# Patient Record
Sex: MALE | Race: WHITE | NOT HISPANIC OR LATINO | URBAN - METROPOLITAN AREA
[De-identification: names, ages, dates, MRNs, and addresses within clinical notes are randomized per-mention and may not be internally consistent; named-entity substitution may affect disease eponyms.]

---

## 2023-10-25 ENCOUNTER — APPOINTMENT (OUTPATIENT)
Dept: RADIOLOGY | Facility: HOSPITAL | Age: 83
End: 2023-10-25
Payer: MEDICARE

## 2023-10-25 ENCOUNTER — HOSPITAL ENCOUNTER (OUTPATIENT)
Facility: HOSPITAL | Age: 83
Setting detail: OBSERVATION
Discharge: LEFT BEFORE DISCHARGE | End: 2023-10-26
Attending: EMERGENCY MEDICINE | Admitting: FAMILY MEDICINE
Payer: MEDICARE

## 2023-10-25 VITALS
TEMPERATURE: 97.7 F | HEIGHT: 70 IN | SYSTOLIC BLOOD PRESSURE: 129 MMHG | DIASTOLIC BLOOD PRESSURE: 95 MMHG | BODY MASS INDEX: 22.9 KG/M2 | WEIGHT: 160 LBS | OXYGEN SATURATION: 91 % | RESPIRATION RATE: 26 BRPM | HEART RATE: 100 BPM

## 2023-10-25 DIAGNOSIS — R55 SYNCOPE, UNSPECIFIED SYNCOPE TYPE: Primary | ICD-10-CM

## 2023-10-25 PROBLEM — H25.11 AGE-RELATED NUCLEAR CATARACT OF RIGHT EYE: Status: ACTIVE | Noted: 2023-10-25

## 2023-10-25 PROBLEM — E55.9 VITAMIN D DEFICIENCY: Status: ACTIVE | Noted: 2023-10-25

## 2023-10-25 PROBLEM — E78.5 DYSLIPIDEMIA: Status: ACTIVE | Noted: 2023-10-25

## 2023-10-25 PROBLEM — G44.009 CLUSTER HEADACHE: Status: ACTIVE | Noted: 2023-10-25

## 2023-10-25 PROBLEM — S22.39XA RIB FRACTURE: Status: ACTIVE | Noted: 2023-10-25

## 2023-10-25 PROBLEM — R91.8 MASS OF RIGHT LUNG: Status: RESOLVED | Noted: 2023-10-25 | Resolved: 2023-10-25

## 2023-10-25 PROBLEM — F17.200 TOBACCO DEPENDENCE: Status: ACTIVE | Noted: 2018-03-07

## 2023-10-25 PROBLEM — H31.011 MACULAR SCAR OF RIGHT EYE: Status: ACTIVE | Noted: 2019-07-10

## 2023-10-25 PROBLEM — R63.4 ABNORMAL WEIGHT LOSS: Status: ACTIVE | Noted: 2023-10-25

## 2023-10-25 PROBLEM — M31.6 GIANT CELL ARTERITIS (MULTI): Status: ACTIVE | Noted: 2023-10-25

## 2023-10-25 PROBLEM — M31.6 GIANT CELL ARTERITIS (MULTI): Status: RESOLVED | Noted: 2023-10-25 | Resolved: 2023-10-25

## 2023-10-25 PROBLEM — M25.50 ACHE IN JOINT: Status: ACTIVE | Noted: 2023-10-25

## 2023-10-25 PROBLEM — S42.033A CLOSED FRACTURE OF DISTAL CLAVICLE: Status: RESOLVED | Noted: 2023-10-25 | Resolved: 2023-10-25

## 2023-10-25 PROBLEM — H43.812 VITREOUS DEGENERATION OF LEFT EYE: Status: ACTIVE | Noted: 2023-10-25

## 2023-10-25 PROBLEM — S42.033A CLOSED FRACTURE OF DISTAL CLAVICLE: Status: ACTIVE | Noted: 2023-10-25

## 2023-10-25 PROBLEM — F17.210 CIGARETTE SMOKER: Status: ACTIVE | Noted: 2023-10-25

## 2023-10-25 PROBLEM — H31.001 CHORIORETINAL SCAR OF RIGHT EYE: Status: ACTIVE | Noted: 2023-10-25

## 2023-10-25 PROBLEM — H25.12 AGE-RELATED NUCLEAR CATARACT OF LEFT EYE: Status: ACTIVE | Noted: 2023-10-25

## 2023-10-25 PROBLEM — R91.8 MASS OF RIGHT LUNG: Status: ACTIVE | Noted: 2023-10-25

## 2023-10-25 PROBLEM — S22.39XA RIB FRACTURE: Status: RESOLVED | Noted: 2023-10-25 | Resolved: 2023-10-25

## 2023-10-25 LAB
ABO GROUP (TYPE) IN BLOOD: NORMAL
ALBUMIN SERPL BCP-MCNC: 3.8 G/DL (ref 3.4–5)
ALP SERPL-CCNC: 63 U/L (ref 33–136)
ALT SERPL W P-5'-P-CCNC: 11 U/L (ref 10–52)
ANION GAP SERPL CALC-SCNC: 15 MMOL/L (ref 10–20)
ANTIBODY SCREEN: NORMAL
APPEARANCE UR: ABNORMAL
AST SERPL W P-5'-P-CCNC: 18 U/L (ref 9–39)
BASOPHILS # BLD AUTO: 0.07 X10*3/UL (ref 0–0.1)
BASOPHILS NFR BLD AUTO: 1.2 %
BILIRUB SERPL-MCNC: 0.6 MG/DL (ref 0–1.2)
BILIRUB UR STRIP.AUTO-MCNC: NEGATIVE MG/DL
BUN SERPL-MCNC: 23 MG/DL (ref 6–23)
CALCIUM SERPL-MCNC: 9.5 MG/DL (ref 8.6–10.6)
CARDIAC TROPONIN I PNL SERPL HS: 49 NG/L (ref 0–53)
CARDIAC TROPONIN I PNL SERPL HS: 51 NG/L (ref 0–53)
CHLORIDE SERPL-SCNC: 102 MMOL/L (ref 98–107)
CHLORIDE UR-SCNC: 105 MMOL/L
CHLORIDE/CREATININE (MMOL/G) IN URINE: 91 MMOL/G CREAT (ref 23–275)
CO2 SERPL-SCNC: 27 MMOL/L (ref 21–32)
COLOR UR: YELLOW
CREAT SERPL-MCNC: 1.53 MG/DL (ref 0.5–1.3)
CREAT UR-MCNC: 115.6 MG/DL (ref 20–370)
EOSINOPHIL # BLD AUTO: 0.17 X10*3/UL (ref 0–0.4)
EOSINOPHIL NFR BLD AUTO: 2.8 %
ERYTHROCYTE [DISTWIDTH] IN BLOOD BY AUTOMATED COUNT: 15.1 % (ref 11.5–14.5)
GFR SERPL CREATININE-BSD FRML MDRD: 45 ML/MIN/1.73M*2
GLUCOSE SERPL-MCNC: 104 MG/DL (ref 74–99)
GLUCOSE UR STRIP.AUTO-MCNC: NEGATIVE MG/DL
HCT VFR BLD AUTO: 33.7 % (ref 41–52)
HGB BLD-MCNC: 11.4 G/DL (ref 13.5–17.5)
HOLD SPECIMEN: NORMAL
IMM GRANULOCYTES # BLD AUTO: 0.02 X10*3/UL (ref 0–0.5)
IMM GRANULOCYTES NFR BLD AUTO: 0.3 % (ref 0–0.9)
INR PPP: 1.2 (ref 0.9–1.1)
IRON SATN MFR SERPL: 8 % (ref 25–45)
IRON SERPL-MCNC: 39 UG/DL (ref 35–150)
KETONES UR STRIP.AUTO-MCNC: NEGATIVE MG/DL
LEUKOCYTE ESTERASE UR QL STRIP.AUTO: ABNORMAL
LYMPHOCYTES # BLD AUTO: 1.37 X10*3/UL (ref 0.8–3)
LYMPHOCYTES NFR BLD AUTO: 22.8 %
MAGNESIUM SERPL-MCNC: 1.96 MG/DL (ref 1.6–2.4)
MCH RBC QN AUTO: 28.9 PG (ref 26–34)
MCHC RBC AUTO-ENTMCNC: 33.8 G/DL (ref 32–36)
MCV RBC AUTO: 86 FL (ref 80–100)
MONOCYTES # BLD AUTO: 0.74 X10*3/UL (ref 0.05–0.8)
MONOCYTES NFR BLD AUTO: 12.3 %
MUCOUS THREADS #/AREA URNS AUTO: ABNORMAL /LPF
NEUTROPHILS # BLD AUTO: 3.64 X10*3/UL (ref 1.6–5.5)
NEUTROPHILS NFR BLD AUTO: 60.6 %
NITRITE UR QL STRIP.AUTO: NEGATIVE
NRBC BLD-RTO: 0 /100 WBCS (ref 0–0)
PH UR STRIP.AUTO: 5 [PH]
PLATELET # BLD AUTO: 228 X10*3/UL (ref 150–450)
PMV BLD AUTO: 11 FL (ref 7.5–11.5)
POTASSIUM SERPL-SCNC: 4 MMOL/L (ref 3.5–5.3)
POTASSIUM UR-SCNC: 28 MMOL/L
POTASSIUM/CREAT UR-RTO: 24 MMOL/G CREAT
PROT SERPL-MCNC: 7.4 G/DL (ref 6.4–8.2)
PROT UR STRIP.AUTO-MCNC: NEGATIVE MG/DL
PROTHROMBIN TIME: 13.2 SECONDS (ref 9.8–12.8)
RBC # BLD AUTO: 3.94 X10*6/UL (ref 4.5–5.9)
RBC # UR STRIP.AUTO: ABNORMAL /UL
RBC #/AREA URNS AUTO: ABNORMAL /HPF
RH FACTOR (ANTIGEN D): NORMAL
SARS-COV-2 RNA RESP QL NAA+PROBE: NOT DETECTED
SODIUM SERPL-SCNC: 140 MMOL/L (ref 136–145)
SODIUM UR-SCNC: 121 MMOL/L
SODIUM/CREAT UR-RTO: 105 MMOL/G CREAT
SP GR UR STRIP.AUTO: 1.01
TIBC SERPL-MCNC: 482 UG/DL (ref 240–445)
TSH SERPL-ACNC: 1.88 MIU/L (ref 0.44–3.98)
UIBC SERPL-MCNC: 443 UG/DL (ref 110–370)
UROBILINOGEN UR STRIP.AUTO-MCNC: 2 MG/DL
WBC # BLD AUTO: 6 X10*3/UL (ref 4.4–11.3)
WBC #/AREA URNS AUTO: ABNORMAL /HPF
WBC CLUMPS #/AREA URNS AUTO: ABNORMAL /HPF

## 2023-10-25 PROCEDURE — 80053 COMPREHEN METABOLIC PANEL: CPT | Performed by: EMERGENCY MEDICINE

## 2023-10-25 PROCEDURE — 86850 RBC ANTIBODY SCREEN: CPT | Performed by: EMERGENCY MEDICINE

## 2023-10-25 PROCEDURE — 83550 IRON BINDING TEST: CPT | Performed by: STUDENT IN AN ORGANIZED HEALTH CARE EDUCATION/TRAINING PROGRAM

## 2023-10-25 PROCEDURE — G0378 HOSPITAL OBSERVATION PER HR: HCPCS

## 2023-10-25 PROCEDURE — 84133 ASSAY OF URINE POTASSIUM: CPT | Performed by: STUDENT IN AN ORGANIZED HEALTH CARE EDUCATION/TRAINING PROGRAM

## 2023-10-25 PROCEDURE — 86901 BLOOD TYPING SEROLOGIC RH(D): CPT | Performed by: EMERGENCY MEDICINE

## 2023-10-25 PROCEDURE — 2500000001 HC RX 250 WO HCPCS SELF ADMINISTERED DRUGS (ALT 637 FOR MEDICARE OP): Performed by: STUDENT IN AN ORGANIZED HEALTH CARE EDUCATION/TRAINING PROGRAM

## 2023-10-25 PROCEDURE — 36415 COLL VENOUS BLD VENIPUNCTURE: CPT | Performed by: EMERGENCY MEDICINE

## 2023-10-25 PROCEDURE — 71045 X-RAY EXAM CHEST 1 VIEW: CPT | Performed by: RADIOLOGY

## 2023-10-25 PROCEDURE — 99291 CRITICAL CARE FIRST HOUR: CPT | Mod: 25 | Performed by: EMERGENCY MEDICINE

## 2023-10-25 PROCEDURE — 36415 COLL VENOUS BLD VENIPUNCTURE: CPT | Mod: 59

## 2023-10-25 PROCEDURE — 83735 ASSAY OF MAGNESIUM: CPT | Performed by: EMERGENCY MEDICINE

## 2023-10-25 PROCEDURE — 87635 SARS-COV-2 COVID-19 AMP PRB: CPT | Performed by: EMERGENCY MEDICINE

## 2023-10-25 PROCEDURE — 84484 ASSAY OF TROPONIN QUANT: CPT | Performed by: EMERGENCY MEDICINE

## 2023-10-25 PROCEDURE — 96360 HYDRATION IV INFUSION INIT: CPT

## 2023-10-25 PROCEDURE — 84443 ASSAY THYROID STIM HORMONE: CPT | Performed by: STUDENT IN AN ORGANIZED HEALTH CARE EDUCATION/TRAINING PROGRAM

## 2023-10-25 PROCEDURE — 81001 URINALYSIS AUTO W/SCOPE: CPT | Performed by: EMERGENCY MEDICINE

## 2023-10-25 PROCEDURE — 71250 CT THORAX DX C-: CPT | Mod: MG

## 2023-10-25 PROCEDURE — 71250 CT THORAX DX C-: CPT | Mod: FOREIGN READ | Performed by: RADIOLOGY

## 2023-10-25 PROCEDURE — 99291 CRITICAL CARE FIRST HOUR: CPT | Performed by: EMERGENCY MEDICINE

## 2023-10-25 PROCEDURE — 71045 X-RAY EXAM CHEST 1 VIEW: CPT | Mod: FY

## 2023-10-25 PROCEDURE — 86900 BLOOD TYPING SEROLOGIC ABO: CPT | Performed by: EMERGENCY MEDICINE

## 2023-10-25 PROCEDURE — 82728 ASSAY OF FERRITIN: CPT | Performed by: STUDENT IN AN ORGANIZED HEALTH CARE EDUCATION/TRAINING PROGRAM

## 2023-10-25 PROCEDURE — 85610 PROTHROMBIN TIME: CPT | Performed by: EMERGENCY MEDICINE

## 2023-10-25 PROCEDURE — 83540 ASSAY OF IRON: CPT | Performed by: STUDENT IN AN ORGANIZED HEALTH CARE EDUCATION/TRAINING PROGRAM

## 2023-10-25 PROCEDURE — 87086 URINE CULTURE/COLONY COUNT: CPT | Performed by: EMERGENCY MEDICINE

## 2023-10-25 PROCEDURE — 85025 COMPLETE CBC W/AUTO DIFF WBC: CPT | Performed by: EMERGENCY MEDICINE

## 2023-10-25 PROCEDURE — 84484 ASSAY OF TROPONIN QUANT: CPT

## 2023-10-25 PROCEDURE — 2500000004 HC RX 250 GENERAL PHARMACY W/ HCPCS (ALT 636 FOR OP/ED): Performed by: EMERGENCY MEDICINE

## 2023-10-25 RX ORDER — TALC
3 POWDER (GRAM) TOPICAL DAILY
Status: DISCONTINUED | OUTPATIENT
Start: 2023-10-25 | End: 2023-10-26 | Stop reason: HOSPADM

## 2023-10-25 RX ORDER — ACETAMINOPHEN 650 MG/1
650 SUPPOSITORY RECTAL EVERY 4 HOURS PRN
Status: DISCONTINUED | OUTPATIENT
Start: 2023-10-25 | End: 2023-10-26 | Stop reason: HOSPADM

## 2023-10-25 RX ORDER — ACETAMINOPHEN 160 MG/5ML
650 SOLUTION ORAL EVERY 4 HOURS PRN
Status: DISCONTINUED | OUTPATIENT
Start: 2023-10-25 | End: 2023-10-26 | Stop reason: HOSPADM

## 2023-10-25 RX ORDER — IBUPROFEN 200 MG
200 TABLET ORAL EVERY 6 HOURS PRN
COMMUNITY

## 2023-10-25 RX ORDER — ENOXAPARIN SODIUM 100 MG/ML
40 INJECTION SUBCUTANEOUS EVERY 24 HOURS
Status: DISCONTINUED | OUTPATIENT
Start: 2023-10-25 | End: 2023-10-26 | Stop reason: HOSPADM

## 2023-10-25 RX ORDER — POLYETHYLENE GLYCOL 3350 17 G/17G
17 POWDER, FOR SOLUTION ORAL DAILY
Status: DISCONTINUED | OUTPATIENT
Start: 2023-10-25 | End: 2023-10-26 | Stop reason: HOSPADM

## 2023-10-25 RX ORDER — ACETAMINOPHEN 325 MG/1
650 TABLET ORAL EVERY 4 HOURS PRN
Status: DISCONTINUED | OUTPATIENT
Start: 2023-10-25 | End: 2023-10-26 | Stop reason: HOSPADM

## 2023-10-25 RX ORDER — ASPIRIN 81 MG/1
81 TABLET ORAL DAILY
COMMUNITY

## 2023-10-25 RX ORDER — ASPIRIN 81 MG/1
81 TABLET ORAL DAILY
Status: DISCONTINUED | OUTPATIENT
Start: 2023-10-25 | End: 2023-10-26 | Stop reason: HOSPADM

## 2023-10-25 RX ADMIN — SODIUM CHLORIDE, SODIUM LACTATE, POTASSIUM CHLORIDE, AND CALCIUM CHLORIDE 1000 ML: 600; 310; 30; 20 INJECTION, SOLUTION INTRAVENOUS at 10:20

## 2023-10-25 RX ADMIN — ASPIRIN 81 MG: 81 TABLET, COATED ORAL at 17:10

## 2023-10-25 ASSESSMENT — COLUMBIA-SUICIDE SEVERITY RATING SCALE - C-SSRS
2. HAVE YOU ACTUALLY HAD ANY THOUGHTS OF KILLING YOURSELF?: NO
1. IN THE PAST MONTH, HAVE YOU WISHED YOU WERE DEAD OR WISHED YOU COULD GO TO SLEEP AND NOT WAKE UP?: NO
6. HAVE YOU EVER DONE ANYTHING, STARTED TO DO ANYTHING, OR PREPARED TO DO ANYTHING TO END YOUR LIFE?: NO

## 2023-10-25 ASSESSMENT — ENCOUNTER SYMPTOMS
PALPITATIONS: 0
VOMITING: 0
LIGHT-HEADEDNESS: 1
DIARRHEA: 0
CONFUSION: 0
UNEXPECTED WEIGHT CHANGE: 0
WEAKNESS: 0
CONSTIPATION: 0
SEIZURES: 0
ANAL BLEEDING: 0
ABDOMINAL PAIN: 0
CHILLS: 0
BLOOD IN STOOL: 0
HEADACHES: 1
NAUSEA: 0
FEVER: 0
ARTHRALGIAS: 0
HEMATURIA: 0
DIZZINESS: 0
SHORTNESS OF BREATH: 0
WOUND: 1

## 2023-10-25 ASSESSMENT — PAIN SCALES - GENERAL
PAINLEVEL_OUTOF10: 0 - NO PAIN
PAINLEVEL_OUTOF10: 0 - NO PAIN

## 2023-10-25 ASSESSMENT — PAIN - FUNCTIONAL ASSESSMENT: PAIN_FUNCTIONAL_ASSESSMENT: 0-10

## 2023-10-25 NOTE — ED PROVIDER NOTES
HPI   Chief Complaint   Patient presents with    Syncope       Limitations to History: None    HPI: This is an 83-year-old male with no reported medical history who presents to the emergency room with concerns for syncopal episodes.  Patient was brought in by his roommate for multiple syncopal episodes that occurred yesterday.  While in triage, patient did have another syncopal episode.  Patient did become hypotensive so patient became a critical patient.  Patient was alert and oriented and was able to answer and respond to questions.  Patient's blood pressure immediately normalized when getting into a room.  Patient is currently asymptomatic and denies any chest pain, shortness of breath, lightheadedness, or dizziness.  Patient denies any headaches.  Patient denies any hemoptysis, recent travel, history of DVT/PE, or swelling of the legs.  Patient does smoke tobacco and has an unknown family history, stating that he is adopted.  Patient denies any drug or alcohol use.  Overall, patient does appear disheveled and does not look like he is taking care of himself well.  Patient states that he lives in a home that does not have running water.    Additional History Obtained from: None    12 point review of systems was performed and is negative unless otherwise specified    ------------------------------------------------------------------------------------------------------------------------------------------  Physical Exam:    VS: As documented in the triage note and EMR flowsheet from this visit were reviewed.    CONSTITUTIONAL: Well appearing, well nourished, awake, alert, oriented to person, place, time/situation and in no apparent distress.   EYES: Clear bilaterally, pupils equal, round and reactive to light.   CARDIOVASCULAR: Normal rate, regular rhythm.  Heart sounds S1, S2.  No murmurs, rubs or gallops.  RESPIRATORY: Breath sounds clear and equal bilaterally. No wheezes or rhonchi.   GASTROINTESTINAL: Abdomen  soft, non-distended, no rebound, no guarding.   MUSCULOSKELETAL: Spine appears normal, range of motion is not limited, no muscle or joint tenderness.   NEUROLOGICAL: Alert and oriented, no focal deficits, no motor or sensory deficits.  Cranial nerves II through XII intact bilaterally.  No cerebellar ataxia.  Patient is able to ambulate effectively.  SKIN: Skin normal color for race, warm, dry and intact. No evidence of trauma.   PSYCHIATRIC: Alert and oriented to person, place, time/situation. normal mood and affect. No apparent risk to self or others.     ------------------------------------------------------------------------------------------------------------------------------------------    Medical Decision Making:    This is an 83-year-old male with no pertinent medical history who presents to the emergency room with multiple syncopal episodes.  Patient remained stable throughout course of care.  Patient's initial blood pressure was concerning for hypotension.  When patient went into a room, his blood pressure normalized back to 126/81 with a normal heart rate.  Neurological exam is completely unremarkable and lowers my concern for stroke.  I have low concern for PE or ACS.  Based on clinical presentation, patient is most likely dehydrated which is causing hypovolemic syncopal episodes.  Given patient's age, he is at risk for cardiogenic syncope.  This patient was staffed my supervising attending.  Patient was started on 1 L LR.  Chest x-ray and EKG were ordered.  Venous full panel, CBC with differential, CMP, magnesium, coagulation screen, COVID screen, troponin, urinalysis, and type and screen ordered.  CBC significant for hemoglobin of 11.4.  Chest x-ray was significant for emphysema but was unremarkable for any acute processes.  CMP was significant for a creatinine of 1.53 which is elevated from baseline, consistent with an acute kidney injury.  Troponin was 51.  Will trend.  I discussed this case with the   and he will be following this case and discussing with Adult Protective Services.  I did discuss this case with my supervising attending and family medicine.  The decision was made to make patient to their service for a syncope work-up which will include a carotid ultrasound and echocardiogram.  Patient was agreeable to spine.  Patient remain stable throughout the remainder course of this provider's care.      External Records Reviewed: I reviewed recent and relevant outside records including: Previous provider notes    Independent Interpretation of Studies:  I independently interpreted: Chest x-ray    Escalation of Care:  Appropriate for admission to family medicine    Social Determinants Affecting Care:  There is some concern that patient is not able to care for himself      Discussion of Management with Other Providers:   I discussed the patient/results with: Family medicine, supervising attending.      SHARONDA Yip, PA-C  [facility]                            Niall Coma Scale Score: 15                  Patient History   Past Medical History:   Diagnosis Date    Macula scars of posterior pole (postinflammatory) (post-traumatic), right eye 07/10/2019    Macular scar of right eye     No past surgical history on file.  No family history on file.  Social History     Tobacco Use    Smoking status: Never    Smokeless tobacco: Never   Substance Use Topics    Alcohol use: Not on file    Drug use: Not on file       Physical Exam   ED Triage Vitals [10/25/23 1001]   Temp Heart Rate Resp BP   36.5 °C (97.7 °F) 69 18 81/57      SpO2 Temp src Heart Rate Source Patient Position   99 % -- -- --      BP Location FiO2 (%)     -- --       Physical Exam    ED Course & MDM   Diagnoses as of 10/27/23 1419   Syncope, unspecified syncope type       Medical Decision Making      Procedure  Critical Care    Performed by: Akash Collazo  MD  Authorized by: Akash Collazo MD    Critical care provider statement:     Critical care time (minutes):  30    Critical care time was exclusive of:  Separately billable procedures and treating other patients and teaching time    Critical care was necessary to treat or prevent imminent or life-threatening deterioration of the following conditions: hemodynamic instability with hypotension and tachycardia requiring aggressive intravenous fluid resuscitation and continuous cardiac monitoring.    Critical care was time spent personally by me on the following activities:  Development of treatment plan with patient or surrogate, examination of patient, evaluation of patient's response to treatment, review of old charts, re-evaluation of patient's condition, ordering and review of radiographic studies, ordering and review of laboratory studies and ordering and performing treatments and interventions    I assumed direction of critical care for this patient from another provider in my specialty: no      Care discussed with: admitting provider         Pepe Khoury PA-C  10/25/23 3766       This patient was seen by the advanced practice provider.  I have personally performed a substantive portion of the encounter.  I have seen and examined the patient; agree with the workup, evaluation, MDM, management and diagnosis.  The care plan has been discussed.         Akash Collazo MD  10/27/23 9339

## 2023-10-25 NOTE — PROGRESS NOTES
"Florentin Anderson is a 83 y.o. male on day 1 of admission presenting with Syncope, unspecified syncope type.    Intervention:   Collaboration with [facility] due to reported bed bugs on Pt at admission. TC said he met with visiting \"roommate\" and \"roommate's brother.  Roommate brother is Vivi Gama [phone].  He allegedly takes Pt and his brother to get food and water. Allegedly Pt's water is off but electricity and gas are on. Vivi reportedly described Pt's residence to [facility] as a \"intermediate house\" that is converted to apartments. Both parties pay rent to a landlord Vivi described as a \" retired pimp\" according to [facility]. Vivi stated his brother is an unreliable historian/coping with dementia. He would like to be a contact if Pt agrees.   Vivi reportedly to [facility] allegedly that his brother is a sex offender. He allegedly met Pt while they were incarcerated 15 years ago. Both are \"on parole for life\" according to [facility] report to this SW. Vivi told [facility] Pt may have poor hygiene due to no water.    Assessment:  Principal Problem:    Syncope, unspecified syncope type  Active Problems:    Syncope and collapse    Plan: To call APS while business hours are open 8-5pm to make report. SW to determine dispo with provider on 10/26 due to end of shift for this SW today.    MONICO Gudino      "

## 2023-10-25 NOTE — PROGRESS NOTES
"I spoke to Vivi Gama ([phone]). Mr. Gama is the patient's roommate's brother. Mr. Gama gave the following information regarding the patient's living situation. The patient lives with his brother in an apartment complex. This complex might have been a half way house in the past. The complex is in poor condition. There is not running water in the complex. The other utilities are on as far as Mr. Gama knows. There is food in their apartment. Mr. Gama takes the patient and his brother to get food and bottle water. Mr. Gama says the eva young is aware of how bad the conditions are in the complex, but he does not care. Mr. Gama thinks the eva young is a \"retired pimp.\" There is an odor that comes from the apartment. Mr. Gama believes they are not taking care of themselves in the apartment. Department to follow.     Con Lindsey RN      "

## 2023-10-25 NOTE — ED TRIAGE NOTES
"Patient came to ED with c/o sycope. States he was walking down the street when he passed out. Friend witnessed episode and states he was \"out\" for 5 to 10 minutes. Patient has had episode like this a couple days ago.   "

## 2023-10-25 NOTE — H&P
History Of Present Illness  Florentin Anderson is a 83 y.o. male who denies any past medical history although who has not seen a physician in >1 year presenting with syncope. He states that he was walking to the bus stop with his friend when he started feeling unsteady. They turned around to walk home, since he didn't think he would make it to the bus stop, but instead he passed out and fell. He thinks he bruised his arm in the fall but denies head injury. He was unconscious for <1 minute. Prior to passing out he reports feeling lightheaded. He denies vertigo, no vision changes, no chest pain, no palpitations, no shortness of breath. He does endorse a mild headache but says that started after the syncopal episode. He denies history of similar episodes. He subsequently came to the ER for evaluation.    While in the waiting room he reported had another syncopal episode, this time while sitting in the ER. He again reports feeling lightheaded prior to the episode and was reportedly unconscious for <1 minute. He was immediately evaluated by ER staff who found him to be markedly hypotensive to 81/57 with a heart rate of 69. Vitals improved spontaneously to 110s/60-70s with HR initially increasing to the 100s. He was treated with 1 L LR for presumed dehydration with improvement in tachycardia.    At time of my evaluation patient denied any symptoms, including dizziness/lightheadedness.    Of note, patient and roommate's brother reported to ER staff that his apartment currently does not have water. Roommates brother reported that they have very poor living conditions and that he is concerned the patient is not taking care of himself. ER SW was consulted. ER staff also reportedly found a bed bug during evaluation and patient reported that he has bed bug bites on his legs.     Past Medical History  Past Medical History:   Diagnosis Date    Closed fracture of distal clavicle 10/25/2023    Giant cell arteritis (CMS/HCC) 10/25/2023     Macula scars of posterior pole (postinflammatory) (post-traumatic), right eye 07/10/2019    Macular scar of right eye    Mass of right lung 10/25/2023    Rib fracture 10/25/2023     Surgical History  Past Surgical History:   Procedure Laterality Date    CHEST SURGERY      patient does not recall the details     Social History  He reports that he has been smoking cigarettes. He has a 30.00 pack-year smoking history. He has never used smokeless tobacco. He reports that he does not drink alcohol and does not use drugs.    Family History  Family History   Adopted: Yes     Allergies  Patient has no known allergies.    Medications:  Medication Documentation Review Audit       Reviewed by Grover Ponce MD (Resident) on 10/25/23 at 1651      Medication Order Taking? Sig Documenting Provider Last Dose Status   aspirin 81 mg EC tablet [id number] No Take 1 tablet (81 mg) by mouth once daily. Historical Provider, MD 10/24/2023 Active   ibuprofen 200 mg tablet [id number]  Take 1 tablet (200 mg) by mouth every 6 hours if needed for mild pain (1 - 3). Historical Provider, MD  Active                   Review of Systems   Constitutional:  Negative for chills, fever and unexpected weight change.   Eyes:  Negative for visual disturbance (although patient does report gradual worsening of vision he denies any changes over the last 24 hours).   Respiratory:  Negative for shortness of breath.    Cardiovascular:  Negative for chest pain and palpitations.   Gastrointestinal:  Negative for abdominal pain, anal bleeding, blood in stool, constipation, diarrhea, nausea and vomiting.   Genitourinary:  Negative for hematuria.   Musculoskeletal:  Negative for arthralgias.   Skin:  Positive for wound (bed bug bites on legs).   Neurological:  Positive for syncope, light-headedness and headaches (mild headache). Negative for dizziness (no vertigo), seizures and weakness.   Psychiatric/Behavioral:  Negative for confusion.      Physical  Exam  Vitals reviewed.   Constitutional:       General: He is not in acute distress.     Appearance: Normal appearance. He is cachectic.      Comments: Appears somewhat disheveled   HENT:      Head: Normocephalic and atraumatic.   Eyes:      Conjunctiva/sclera: Conjunctivae normal.   Neck:      Vascular: No carotid bruit.   Cardiovascular:      Rate and Rhythm: Normal rate. Rhythm irregular.      Pulses: Normal pulses.           Dorsalis pedis pulses are 2+ on the right side and 2+ on the left side.      Heart sounds: No murmur heard.     No friction rub. No gallop.   Pulmonary:      Effort: Pulmonary effort is normal. No respiratory distress.      Breath sounds: Wheezing (end expiratory wheezing with prolonged expiratory phase) present. No rhonchi or rales.   Abdominal:      General: Abdomen is flat.      Palpations: Abdomen is soft.      Tenderness: There is no abdominal tenderness.   Musculoskeletal:         General: Normal range of motion.      Cervical back: Normal range of motion.   Feet:      Right foot:      Skin integrity: Skin integrity normal.      Toenail Condition: Right toenails are abnormally thick. Fungal disease present.     Left foot:      Skin integrity: Skin integrity normal.      Toenail Condition: Left toenails are abnormally thick. Fungal disease present.  Skin:     General: Skin is warm and dry.      Findings: Rash and wound (numerous open punctate lesions on the bilateral lower extremities and back which patient attributes to bed bugs) present. Rash is macular (hypopigmented macules across the upper back).   Neurological:      General: No focal deficit present.      Mental Status: He is alert and oriented to person, place, and time.      Comments: CN 2-12 grossly intact   Psychiatric:         Mood and Affect: Affect normal.         Speech: Speech normal.         Behavior: Behavior normal.       Last Recorded Vitals  Blood pressure 149/78, pulse 104, temperature 36.5 °C (97.7 °F), resp. rate  "19, height 1.778 m (5' 10\"), weight 72.6 kg (160 lb), SpO2 91 %.    Relevant Results    Results for orders placed or performed during the hospital encounter of 10/25/23 (from the past 24 hour(s))   CBC and Auto Differential   Result Value Ref Range    WBC 6.0 4.4 - 11.3 x10*3/uL    nRBC 0.0 0.0 - 0.0 /100 WBCs    RBC 3.94 (L) 4.50 - 5.90 x10*6/uL    Hemoglobin 11.4 (L) 13.5 - 17.5 g/dL    Hematocrit 33.7 (L) 41.0 - 52.0 %    MCV 86 80 - 100 fL    MCH 28.9 26.0 - 34.0 pg    MCHC 33.8 32.0 - 36.0 g/dL    RDW 15.1 (H) 11.5 - 14.5 %    Platelets 228 150 - 450 x10*3/uL    MPV 11.0 7.5 - 11.5 fL    Neutrophils % 60.6 40.0 - 80.0 %    Immature Granulocytes %, Automated 0.3 0.0 - 0.9 %    Lymphocytes % 22.8 13.0 - 44.0 %    Monocytes % 12.3 2.0 - 10.0 %    Eosinophils % 2.8 0.0 - 6.0 %    Basophils % 1.2 0.0 - 2.0 %    Neutrophils Absolute 3.64 1.60 - 5.50 x10*3/uL    Immature Granulocytes Absolute, Automated 0.02 0.00 - 0.50 x10*3/uL    Lymphocytes Absolute 1.37 0.80 - 3.00 x10*3/uL    Monocytes Absolute 0.74 0.05 - 0.80 x10*3/uL    Eosinophils Absolute 0.17 0.00 - 0.40 x10*3/uL    Basophils Absolute 0.07 0.00 - 0.10 x10*3/uL   Comprehensive metabolic panel   Result Value Ref Range    Glucose 104 (H) 74 - 99 mg/dL    Sodium 140 136 - 145 mmol/L    Potassium 4.0 3.5 - 5.3 mmol/L    Chloride 102 98 - 107 mmol/L    Bicarbonate 27 21 - 32 mmol/L    Anion Gap 15 10 - 20 mmol/L    Urea Nitrogen 23 6 - 23 mg/dL    Creatinine 1.53 (H) 0.50 - 1.30 mg/dL    eGFR 45 (L) >60 mL/min/1.73m*2    Calcium 9.5 8.6 - 10.6 mg/dL    Albumin 3.8 3.4 - 5.0 g/dL    Alkaline Phosphatase 63 33 - 136 U/L    Total Protein 7.4 6.4 - 8.2 g/dL    AST 18 9 - 39 U/L    Bilirubin, Total 0.6 0.0 - 1.2 mg/dL    ALT 11 10 - 52 U/L   Magnesium   Result Value Ref Range    Magnesium 1.96 1.60 - 2.40 mg/dL   Troponin I, High Sensitivity   Result Value Ref Range    Troponin I, High Sensitivity 51 0 - 53 ng/L   Type And Screen   Result Value Ref Range    ABO TYPE A "     Rh TYPE POS     ANTIBODY SCREEN NEG    Protime-INR   Result Value Ref Range    Protime 13.2 (H) 9.8 - 12.8 seconds    INR 1.2 (H) 0.9 - 1.1   Iron and TIBC   Result Value Ref Range    Iron 39 35 - 150 ug/dL    UIBC 443 (H) 110 - 370 ug/dL    TIBC 482 (H) 240 - 445 ug/dL    % Saturation 8 (L) 25 - 45 %   SARS-CoV-2 RT PCR   Result Value Ref Range    Coronavirus 2019, PCR Not Detected Not Detected   Urinalysis with Reflex Microscopic and Culture   Result Value Ref Range    Color, Urine Yellow Straw, Yellow    Appearance, Urine Hazy (N) Clear    Specific Gravity, Urine 1.015 1.005 - 1.035    pH, Urine 5.0 5.0, 5.5, 6.0, 6.5, 7.0, 7.5, 8.0    Protein, Urine NEGATIVE NEGATIVE mg/dL    Glucose, Urine NEGATIVE NEGATIVE mg/dL    Blood, Urine MODERATE (2+) (A) NEGATIVE    Ketones, Urine NEGATIVE NEGATIVE mg/dL    Bilirubin, Urine NEGATIVE NEGATIVE    Urobilinogen, Urine 2.0 (N) <2.0 mg/dL    Nitrite, Urine NEGATIVE NEGATIVE    Leukocyte Esterase, Urine MODERATE (2+) (A) NEGATIVE   Extra Urine Gray Tube   Result Value Ref Range    Extra Tube Hold for add-ons.    Troponin I, High Sensitivity   Result Value Ref Range    Troponin I, High Sensitivity 49 0 - 53 ng/L   Microscopic Only, Urine   Result Value Ref Range    WBC, Urine 21-50 (A) 1-5, NONE /HPF    WBC Clumps, Urine RARE Reference range not established. /HPF    RBC, Urine 11-20 (A) NONE, 1-2, 3-5 /HPF    Mucus, Urine 1+ Reference range not established. /LPF   Urine electrolytes   Result Value Ref Range    Sodium, Urine Random 121 mmol/L    Sodium/Creatinine Ratio 105 Not established. mmol/g Creat    Potassium, Urine Random 28 mmol/L    Potassium/Creatinine Ratio 24 Not established mmol/g Creat    Chloride, Urine Random 105 mmol/L    Chloride/Creatinine Ratio 91 23 - 275 mmol/g creat    Creatinine, Urine Random 115.6 20.0 - 370.0 mg/dL      XR chest 1 view  FINDINGS: The cardiac silhouette size is within normal limits. Hyperinflated lungs with flattened hemidiaphragms  compatible with emphysema. Calcified granulomas noted in the right lower lung. Large hiatal hernia noted. There is no focal consolidation, edema or pneumothorax. No sizeable pleural effusion. No acute osseous abnormality. Chronic posttraumatic sequela of the right lateral clavicle noted.   1. No acute cardiopulmonary process. 2. Findings of emphysema  Signed by: Viri Sullivan 10/25/2023 11:54 AM    EKG: NSR at 87 bpm with frequent PACs, left axis, normal intervals, no acute ST-T changes    Assessment/Plan   Principal Problem:    Syncope, unspecified syncope type    #Syncope  -patient presents w/ multiple syncope episodes preceded by lightheadedness  -differential includes orthostatic syncope, cardiogenic syncope, vasovagal syncope, and neurologic syncope  -lightheadedness is c/w orthostatic or vasovagal syncope, although syncope did not occur immediately after standing and was not associated with vagal maneuver  -hypotension immediately post syncope w/ HR in the 60s may be c/w vasovagal syncope  -improvement in symptoms after IVF and the fact that patient does not have water at home suggests possible dehydration which may contribute to orthostatic syncope  -cardiac workup in the ER negative with the exception of frequent PACs on ECG  -no known cardiac history, although prior imaging has shown significant coronary artery calcifications  -no other neuro symptoms, unremarkable neuro exam, no seizure activity, so no indication for carotid artery assessment or brain imaging  -hypotension with BP of <90 after syncopal episode does place patient at high risk of cardiogenic syncope, especially given CAD and age  [ ] maintain on telemetry for further evaluation  [ ]echocardiogram for further assessment of cardiac function  [ ]orthostatic vital signs  [ ]continue to monitor for symptoms and consider possible need for outpatient autonomic testing    #Lung mass  #Emphysema  #Nicotine dependence  -hx of previously diagnosed  RUL mass w/o PET uptake; recommended for 6 month f/up CT but has not had follow up  [ ] repeat chest CT while admitted for further assessment given noted cachexia and long smoking history  -chest imaging c/w emphysema although w/o formal diagnosis of COPD; currently w/o respiratory distress, continue to monitor  -offer nicotine patch, smoking cessation counseling PRN    #JULIETA vs CKD  -creatinine on admission is 1.53, up from 1.23 on last labs from April 2022  -possible pre-renal d/t dehydration given hypotension, syncope, lack of running water  -s/p 1 L IVF in the ER  [ ] urine electrolytes for further evaluation    #Cachexia/dishevelment  -patient appears cachectic on exam w/ concerns that he is not adequately caring for himself and that his apartment is unsafe (lack of running water, presence of bed bugs)  -A&O x3 on exam, no apparent cognitive deficits; reports he is independent in ADLs, receives assistance from roommate with IADLs  [ ] referral to  for additional evaluation of living environment  [ ] folate, B12, TSH, iron studies, prealbumin, vitamin D ordered for further evaluation  -HCV and syphilis negative April 2022, HIV negative April 2014; not re-ordered  -bed bug precautions    #Abnormal urinalysis  -UA obtained in the ER w/ positive leukocyte esterase, elevated WBCs and RBCs  -no reports of urinary symptoms  -urine culture pending    #CAD  #Hyperlipidemia  -noted on prior chest imaging and lipid panels  -currently only taking daily baby aspirin  -consider addition of statin on discharge    F: PO  E: Replete as needed  N: Regular diet  GI: Not indicated at this time  DVT: Lovenox SQ  FULL CODE (assumed, discussion not completed on admission; follow up in the AM)    To be discussed with attending Dr. Mcclure in the morning.    Grover Ponce MD  Family Medicine PGY-3

## 2023-10-25 NOTE — PROGRESS NOTES
Provider informed me that he had concerns that the patient was not taking care of himself at home. Provider stated that the patient will likely be admitted to the hospital for syncope. Provider would like someone to follow the patient after discharge to make sure he is taking care of himself. Patient is in isolation for possible bed bugs. SW consulted for APS referral.   Con Lindesy RN

## 2023-10-26 ENCOUNTER — TELEPHONE (OUTPATIENT)
Dept: EMERGENCY MEDICINE | Facility: HOSPITAL | Age: 83
End: 2023-10-26
Payer: MEDICARE

## 2023-10-26 LAB
BACTERIA UR CULT: ABNORMAL
FERRITIN SERPL-MCNC: 21 NG/ML (ref 20–300)

## 2023-10-26 PROCEDURE — G0378 HOSPITAL OBSERVATION PER HR: HCPCS

## 2023-10-26 NOTE — SIGNIFICANT EVENT
Noted at 3:15am that this patient had fallen off of our list and I had not received any notification of patient trying to leave against medical advice. Called ED to inquire about patient's current whereabouts, spoke to the charge nurse who stated that patient's RN was tied up with a critical patient, and when they returned to check on the patient, he had eloped. Reportedly no discussion was had between patient and the care team regarding leaving against medical advice. At this time, patient is considered discharged AMA.    Silvana Quiroz MD  Family Medicine PGY-3

## 2023-10-26 NOTE — PROGRESS NOTES
Call placed to APS after hours [phone]. Intake staff member, Ms Christie, took report. She will communicate concerns about unsafe home environment to APS intake the next business day. Spoke with Dr Ponce who confirmed Pt eloped from ED last night. He confirmed he spoke with Pt by phone and confirmed he was home safe.  SHY Ascencio

## 2023-10-27 ENCOUNTER — DOCUMENTATION (OUTPATIENT)
Dept: CASE MANAGEMENT | Facility: HOSPITAL | Age: 83
End: 2023-10-27
Payer: MEDICARE

## 2023-10-27 ENCOUNTER — TELEPHONE (OUTPATIENT)
Dept: PHARMACY | Facility: HOSPITAL | Age: 83
End: 2023-10-27
Payer: MEDICARE

## 2023-10-27 NOTE — SIGNIFICANT EVENT
Patient eloped from the emergency department last night after being admitted to the family medicine service but prior to being staffed with the attending physician.    I spoke with the patient on the phone on the afternoon of 10/26 and confirmed that he was well. He reported that the left the ER because no one responded when he asked for assistance using the bathroom. He confirmed that he returned home safely and that he feels well without further syncopal episodes. I expressed my concern to the patient regarding his incomplete evaluation but he declined to return to the ER for reassessment. He stated that if he started to feel worse he would return. I provided the patient with the number for the family medicine clinic where he previously followed for primary care and requested that he call the clinic to schedule a follow up appointment so that we can complete the evaluation of his syncopal episodes. Patient expressed understanding.    Grover Ponce MD   PGY-3

## 2023-10-27 NOTE — PROGRESS NOTES
EDPD Note: Rapid Result Review    Reviewed Mr./Mrs./Ms. Florentin Anderson 's chart regarding a Contaminated culture that was taken during their recent emergency room visit. The patient was not told about these results prior to leaving the emergency department. Therefore, patient was contacted and given proper education.  The patient is not having any symptoms and hence I recommended that he does not need to repeat the test, but in case he develops any fever or symptoms in the next 2 days he can visit an Urgent care of the ED to be checked.     10/25/23 16:19   URINE CULTURE Rpt !   !: Data is abnormal  Rpt: View report in Results Review for more information    No further follow up needed from EDPD Team.     Kalyn Zhou, PharmD

## 2023-10-31 ENCOUNTER — TELEPHONE (OUTPATIENT)
Dept: CRITICAL CARE MEDICINE | Facility: HOSPITAL | Age: 83
End: 2023-10-31
Payer: MEDICARE

## 2024-08-27 ENCOUNTER — HOSPITAL ENCOUNTER (EMERGENCY)
Facility: HOSPITAL | Age: 84
Discharge: HOME | End: 2024-08-27
Attending: EMERGENCY MEDICINE
Payer: MEDICARE

## 2024-08-27 ENCOUNTER — CLINICAL SUPPORT (OUTPATIENT)
Dept: EMERGENCY MEDICINE | Facility: HOSPITAL | Age: 84
End: 2024-08-27
Payer: MEDICARE

## 2024-08-27 VITALS
SYSTOLIC BLOOD PRESSURE: 114 MMHG | RESPIRATION RATE: 16 BRPM | TEMPERATURE: 98.1 F | HEART RATE: 59 BPM | DIASTOLIC BLOOD PRESSURE: 66 MMHG | OXYGEN SATURATION: 95 %

## 2024-08-27 DIAGNOSIS — R42 DIZZINESS: ICD-10-CM

## 2024-08-27 DIAGNOSIS — I95.9 HYPOTENSION, UNSPECIFIED HYPOTENSION TYPE: Primary | ICD-10-CM

## 2024-08-27 LAB
ALBUMIN SERPL BCP-MCNC: 3.6 G/DL (ref 3.4–5)
ALP SERPL-CCNC: 75 U/L (ref 33–136)
ALT SERPL W P-5'-P-CCNC: 30 U/L (ref 10–52)
ANION GAP SERPL CALC-SCNC: 14 MMOL/L (ref 10–20)
AST SERPL W P-5'-P-CCNC: 34 U/L (ref 9–39)
BASOPHILS # BLD AUTO: 0.07 X10*3/UL (ref 0–0.1)
BASOPHILS NFR BLD AUTO: 1.1 %
BILIRUB SERPL-MCNC: 0.6 MG/DL (ref 0–1.2)
BUN SERPL-MCNC: 26 MG/DL (ref 6–23)
CALCIUM SERPL-MCNC: 9.4 MG/DL (ref 8.6–10.6)
CHLORIDE SERPL-SCNC: 106 MMOL/L (ref 98–107)
CO2 SERPL-SCNC: 24 MMOL/L (ref 21–32)
CREAT SERPL-MCNC: 1.67 MG/DL (ref 0.5–1.3)
EGFRCR SERPLBLD CKD-EPI 2021: 41 ML/MIN/1.73M*2
EOSINOPHIL # BLD AUTO: 0.19 X10*3/UL (ref 0–0.4)
EOSINOPHIL NFR BLD AUTO: 3.1 %
ERYTHROCYTE [DISTWIDTH] IN BLOOD BY AUTOMATED COUNT: 14.2 % (ref 11.5–14.5)
GLUCOSE SERPL-MCNC: 83 MG/DL (ref 74–99)
HCT VFR BLD AUTO: 44 % (ref 41–52)
HGB BLD-MCNC: 14.5 G/DL (ref 13.5–17.5)
HOLD SPECIMEN: NORMAL
IMM GRANULOCYTES # BLD AUTO: 0.03 X10*3/UL (ref 0–0.5)
IMM GRANULOCYTES NFR BLD AUTO: 0.5 % (ref 0–0.9)
LYMPHOCYTES # BLD AUTO: 1.48 X10*3/UL (ref 0.8–3)
LYMPHOCYTES NFR BLD AUTO: 24.2 %
MAGNESIUM SERPL-MCNC: 2.25 MG/DL (ref 1.6–2.4)
MCH RBC QN AUTO: 31.9 PG (ref 26–34)
MCHC RBC AUTO-ENTMCNC: 33 G/DL (ref 32–36)
MCV RBC AUTO: 97 FL (ref 80–100)
MONOCYTES # BLD AUTO: 0.95 X10*3/UL (ref 0.05–0.8)
MONOCYTES NFR BLD AUTO: 15.5 %
NEUTROPHILS # BLD AUTO: 3.4 X10*3/UL (ref 1.6–5.5)
NEUTROPHILS NFR BLD AUTO: 55.6 %
NRBC BLD-RTO: 0 /100 WBCS (ref 0–0)
PLATELET # BLD AUTO: 181 X10*3/UL (ref 150–450)
POTASSIUM SERPL-SCNC: 4.1 MMOL/L (ref 3.5–5.3)
PROT SERPL-MCNC: 7.4 G/DL (ref 6.4–8.2)
RBC # BLD AUTO: 4.55 X10*6/UL (ref 4.5–5.9)
SODIUM SERPL-SCNC: 140 MMOL/L (ref 136–145)
WBC # BLD AUTO: 6.1 X10*3/UL (ref 4.4–11.3)

## 2024-08-27 PROCEDURE — 99284 EMERGENCY DEPT VISIT MOD MDM: CPT | Performed by: EMERGENCY MEDICINE

## 2024-08-27 PROCEDURE — 93010 ELECTROCARDIOGRAM REPORT: CPT | Performed by: EMERGENCY MEDICINE

## 2024-08-27 PROCEDURE — 93005 ELECTROCARDIOGRAM TRACING: CPT

## 2024-08-27 PROCEDURE — 99283 EMERGENCY DEPT VISIT LOW MDM: CPT | Mod: 25

## 2024-08-27 PROCEDURE — 36415 COLL VENOUS BLD VENIPUNCTURE: CPT | Performed by: EMERGENCY MEDICINE

## 2024-08-27 PROCEDURE — 2500000004 HC RX 250 GENERAL PHARMACY W/ HCPCS (ALT 636 FOR OP/ED)

## 2024-08-27 PROCEDURE — 85025 COMPLETE CBC W/AUTO DIFF WBC: CPT | Performed by: EMERGENCY MEDICINE

## 2024-08-27 PROCEDURE — 82565 ASSAY OF CREATININE: CPT | Performed by: EMERGENCY MEDICINE

## 2024-08-27 PROCEDURE — 96360 HYDRATION IV INFUSION INIT: CPT

## 2024-08-27 PROCEDURE — 83735 ASSAY OF MAGNESIUM: CPT

## 2024-08-27 ASSESSMENT — PAIN - FUNCTIONAL ASSESSMENT: PAIN_FUNCTIONAL_ASSESSMENT: 0-10

## 2024-08-27 ASSESSMENT — PAIN SCALES - GENERAL
PAINLEVEL_OUTOF10: 0 - NO PAIN
PAINLEVEL_OUTOF10: 0 - NO PAIN

## 2024-08-27 ASSESSMENT — COLUMBIA-SUICIDE SEVERITY RATING SCALE - C-SSRS
2. HAVE YOU ACTUALLY HAD ANY THOUGHTS OF KILLING YOURSELF?: NO
6. HAVE YOU EVER DONE ANYTHING, STARTED TO DO ANYTHING, OR PREPARED TO DO ANYTHING TO END YOUR LIFE?: NO
1. IN THE PAST MONTH, HAVE YOU WISHED YOU WERE DEAD OR WISHED YOU COULD GO TO SLEEP AND NOT WAKE UP?: NO

## 2024-08-27 NOTE — DISCHARGE INSTRUCTIONS
You were seen for low blood pressure found on a routine exam at your living facility and dizziness. In the emergency room your blood pressure was back to normal. If you start to feel dizzy again or fall down please let someone know so they can check on you. Return to the emergency room if you have worsening dizziness, falls, or any other new symptoms. It was a pleasure caring for you.

## 2024-08-27 NOTE — ED PROVIDER NOTES
History of Present Illness     History provided by: Patient and living facility ([facility])  Limitations to History: None  External Records Reviewed with Brief Summary: Patient was hospitalized at [facility] 11/3/24-11/31/24 for syncope and collapse. Was found to have atrial fibrillation and was started on amiodarone.     HPI:  Florentin Anderson is a 84 y.o. male with giant cell arteritis, recurrent syncope, chronic lung mass, and atrial fibrillation presenting with a chief complaint of low blood pressure found on routine exam at his living facility. Per patient he was told that he had low blood pressure this morning. Per paperwork provided by nursing facility manual BP was 62/42 and patient typically has systolic blood pressure >100. The patient states he has been feeling normal. He endorses dizziness that he has had for months that occurs with standing. He states it feels like the room is spinning when this occurs. He states that he has not fallen or lost consciousness since he has been living at his nursing facility. The patient uses a wheelchair at baseline but is able to walk a few steps. Patient denies decreased appetite, chest pain, shortness of breath, abdominal pain, nausea, vomiting, diarrhea, fevers, chills, urinary frequency/incontinence/pain, weakness, or numbness.  I called [facility] to obtain collateral information. Per nursing home the patient has been at baseline and has not complained of any symptoms including dizziness the past few days. They state his oral intake as been normal. This morning his heart rate was 50, and he typically runs in the 50s.     Medications: amiodarone 200mg daily, metoprolol 50mg daily, vitamin B12, multivitamin, tramadol 50mg IV as needed (for shingles pain)    Social history: lives at [facility] ([phone]), actively smoking tobacco        Physical Exam   Triage vitals:  T 36.8 °C (98.2 °F)  HR 59  /62  RR 15  O2 96 % None (Room  air)    General: Awake, alert, in no acute distress  Eyes: No scleral icterus or injection  HENT: Normo-cephalic, atraumatic. No stridor  CV: Regular rate, regular rhythm. Radial pulses 2+ bilaterally  Resp: Breathing non-labored, speaking in full sentences.  Clear to auscultation bilaterally.  GI: Soft, non-distended, non-tender. No rebound or guarding.  MSK/Extremities: No gross bony deformities. Moving all extremities  Skin: Resolving shingles rash on right abdomen. Warm. Appropriate color.   Neuro: Alert. Oriented. Face symmetric. Speech is fluent.  Cranial nerves III-XII intact. 5/5 strength in upper and lower extremities.   Psych: Appropriate mood and affect    Medical Decision Making & ED Course   Medical Decision Making and ED course:  84 y.o. male with giant cell arteritis, recurrent syncope, chronic lung mass, and atrial fibrillation presenting with a chief complaint of low blood pressure on routine exam at living facility and chronic dizziniess. The patient was given LR 500cc IV in case dehydration was contributing to his symptoms. Due to history of atrial fibrillation with get EKG to evaluate for arrhythmia causing dizziness. EKG revealed sinus bradycardia (HR 51) and no signs of arrhythmia. Per patient's living facility HR in the low 50s is typical for him. We will obtain orthostatic vital signs to evaluate for orthostatic hypotension. Orthostatic vital signs were negative (119/63 lying, 114/53 sitting, 124/57 standing). We debra get a CBC and CMP+Mg to evaluate for anemia, electrolyte abnormalities, and infection that may be causing symptoms. CBC with diff was within normal limits, so symptoms are likely not due to infection or anemia. Creatinine was 1.67. Patient is not having urinary symptoms, suggesting elevated creatinine may be due to undiagnosed chronic kidney disease or prerenal in etiology. Patient was already given 500cc IV fluids. CMP and Mg were otherwise normal.   The patient has no  neurological deficits on physical exam or review of systems so suspicion for intracranial process is low and does not warrant imaging. The patient's systolic blood pressure has been >100 since admitted to the ED and he denies further dizziness. Patient is stable for discharge to assisted living facility.     ----      Differential diagnoses considered include but are not limited to: Orthostatic hypotension, dehydration, dizziness      Social Determinants of Health which Significantly Impact Care: None identified     EKG Independent Interpretation: EKG interpreted by myself. Please see ED Course for full interpretation.    Independent Result Review and Interpretation: Relevant laboratory and radiographic results were reviewed and independently interpreted by myself.  As necessary, they are commented on in the ED Course.    Chronic conditions affecting the patient's care: As documented above in MDM    The patient was discussed with the following consultants/services: None    Care Considerations: As documented above in MDM      Disposition   Discharge to [facility].     Gwen Rod MD  Emergency Medicine      Gwen Rod MD  Resident  08/27/24 2439

## 2024-08-27 NOTE — ED TRIAGE NOTES
Pt presents to ED from [facility] via [facility] for c/c hypotension and dizziness. Per EMS, [facility] called stating that pt bp ws 60/40 and that he was endorsing new onset dizziness. En route, pt denied dizziness & was normotensive, afib (hx), . On arrival, pt denies complaints, is well appearing, A&Ox4.

## 2024-08-28 LAB
ATRIAL RATE: 51 BPM
P AXIS: 106 DEGREES
P OFFSET: 150 MS
P ONSET: 121 MS
PR INTERVAL: 198 MS
Q ONSET: 220 MS
QRS COUNT: 9 BEATS
QRS DURATION: 94 MS
QT INTERVAL: 462 MS
QTC CALCULATION(BAZETT): 425 MS
QTC FREDERICIA: 438 MS
R AXIS: -39 DEGREES
T AXIS: 72 DEGREES
T OFFSET: 451 MS
VENTRICULAR RATE: 51 BPM

## 2024-09-24 ENCOUNTER — OFFICE VISIT (OUTPATIENT)
Dept: PULMONOLOGY | Facility: CLINIC | Age: 84
End: 2024-09-24
Payer: MEDICARE

## 2024-09-24 VITALS
HEIGHT: 70 IN | TEMPERATURE: 97.2 F | RESPIRATION RATE: 18 BRPM | WEIGHT: 145 LBS | OXYGEN SATURATION: 97 % | BODY MASS INDEX: 20.76 KG/M2 | HEART RATE: 56 BPM | DIASTOLIC BLOOD PRESSURE: 61 MMHG | SYSTOLIC BLOOD PRESSURE: 93 MMHG

## 2024-09-24 DIAGNOSIS — J43.2 CENTRILOBULAR EMPHYSEMA (MULTI): Primary | ICD-10-CM

## 2024-09-24 DIAGNOSIS — R93.89 ABNORMAL CT OF THE CHEST: ICD-10-CM

## 2024-09-24 DIAGNOSIS — Z71.89 GOALS OF CARE, COUNSELING/DISCUSSION: ICD-10-CM

## 2024-09-24 PROCEDURE — 1160F RVW MEDS BY RX/DR IN RCRD: CPT | Performed by: INTERNAL MEDICINE

## 2024-09-24 PROCEDURE — 99205 OFFICE O/P NEW HI 60 MIN: CPT | Performed by: INTERNAL MEDICINE

## 2024-09-24 PROCEDURE — 99215 OFFICE O/P EST HI 40 MIN: CPT | Performed by: INTERNAL MEDICINE

## 2024-09-24 PROCEDURE — 1126F AMNT PAIN NOTED NONE PRSNT: CPT | Performed by: INTERNAL MEDICINE

## 2024-09-24 PROCEDURE — 1159F MED LIST DOCD IN RCRD: CPT | Performed by: INTERNAL MEDICINE

## 2024-09-24 RX ORDER — METOPROLOL TARTRATE 50 MG/1
50 TABLET ORAL 2 TIMES DAILY
COMMUNITY

## 2024-09-24 RX ORDER — TRAMADOL HYDROCHLORIDE 50 MG/1
50 TABLET ORAL EVERY 8 HOURS PRN
COMMUNITY

## 2024-09-24 RX ORDER — UBIDECARENONE 75 MG
1000 CAPSULE ORAL DAILY
COMMUNITY

## 2024-09-24 RX ORDER — AMIODARONE HYDROCHLORIDE 200 MG/1
200 TABLET ORAL DAILY
COMMUNITY

## 2024-09-24 RX ORDER — MULTIVIT-MIN/IRON FUM/FOLIC AC 7.5 MG-4
1 TABLET ORAL DAILY
COMMUNITY

## 2024-09-24 ASSESSMENT — PAIN SCALES - GENERAL: PAINLEVEL: 0-NO PAIN

## 2024-09-24 NOTE — PROGRESS NOTES
"    Department of Medicine  Division of Pulmonary, Critical Care, and Sleep Medicine  Location  [facility], [address]    I was asked by No ref. provider found, to evaluate Florentin Anderson for abnormal CT chest. I have independently interviewed and examined the patient in the office and reviewed available records.     Physician HPI (9/24/2024):  84 y.o. male who is an active smoker with a \"chronic lung mass.\" He was admitted to [facility] in November 2023 after syncope and had rib fractures and T10/T11 compression fractures. He was last seen by pulmonary in 2021 for RUL lesion and underwent PET/CT at that time demonstrating minimal FDG uptake. His lesion was therefore thought to be a scar. He has a history of esophageal cancer s/p esophagectomy with gastric pull-through, Afib, giant cell arteritis. Per [facility] notes, he is DNR and DNI.    Patient is unsure why he is in my office today, and there is no referral in our system. He is in a wheelchair, and states he is minimally ambulatory. He denies any shortness of breath, cough, or wheeze. He also does not have or use any inhalers. He has intentions of quitting smoking. When asked about his code status he stated \"if my heart stops, let it stop.\"    PMH:  Past Medical History:   Diagnosis Date    Closed fracture of distal clavicle 10/25/2023    Giant cell arteritis (Multi) 10/25/2023    Macula scars of posterior pole (postinflammatory) (post-traumatic), right eye 07/10/2019    Macular scar of right eye    Mass of right lung 10/25/2023    Paroxysmal atrial fibrillation (Multi)     Rib fracture 10/25/2023    UTI (urinary tract infection)        PSH:  Past Surgical History:   Procedure Laterality Date    CHEST SURGERY      patient does not recall the details       FHx:  Family History   Adopted: Yes   Problem Relation Name Age of Onset    Heart attack Mother      Heart attack Father         Social Hx:  Social History     Socioeconomic History    Marital status: Single "   Tobacco Use    Smoking status: Every Day     Current packs/day: 0.50     Average packs/day: 0.5 packs/day for 60.0 years (30.0 ttl pk-yrs)     Types: Cigarettes    Smokeless tobacco: Never   Substance and Sexual Activity    Alcohol use: Never    Drug use: Never     Social Determinants of Health     Financial Resource Strain: Not on File (2019)    Received from [facility]    Financial Resource Strain     Financial Resource Strain: 0   Food Insecurity: Not on File (2019)    Received from [facility]    Food Insecurity     Food: 0   Transportation Needs: Not on File (2019)    Received from [facility]    Transportation Needs     Transportation: 0   Physical Activity: Not on File (2019)    Received from [facility]    Physical Activity     Physical Activity: 0   Stress: Not on File (2019)    Received from [facility]    Stress     Stress: 0   Social Connections: Not on File (2019)    Received from [facility]    Social Connections     Social Connections and Isolation: 0   Housing Stability: Not on File (2019)    Received from [facility]    Housing Stability     Housin       Immunization History:  Immunization History   Administered Date(s) Administered    Flu vaccine, quadrivalent, high-dose, preservative free, age 65y+ (FLUZONE) 09/10/2020, 2021    Flu vaccine, trivalent, preservative free, HIGH-DOSE, age 65y+ (Fluzone) 10/16/2019    Influenza, Unspecified 10/10/2011, 10/14/2013    Influenza, seasonal, injectable 10/06/2008, 10/13/2009, 10/19/2010, 10/08/2012    Pneumococcal polysaccharide vaccine, 23-valent, age 2 years and older (PNEUMOVAX 23) 2010    Tdap vaccine, age 7 year and older (BOOSTRIX, ADACEL) 2013    Zoster, live 2013       Current Medications:  Current Outpatient Medications   Medication Instructions    amiodarone (PACERONE) 200 mg, oral, Daily    aspirin 81 mg, oral, Daily    cyanocobalamin (VITAMIN B-12) 1,000 mcg, oral, Daily     "ibuprofen 200 mg, oral, Every 6 hours PRN    metoprolol tartrate (LOPRESSOR) 50 mg, oral, 2 times daily    multivitamin with minerals tablet 1 tablet, oral, Daily    traMADol (ULTRAM) 50 mg, oral, Every 8 hours PRN        Drug Allergies/Intolerances:  No Known Allergies     Review of Systems:  Review of Systems     All other review of systems are negative and/or non-contributory.    Physical Examination:  Vitals:    09/24/24 1309   BP: 93/61   BP Location: Right arm   Patient Position: Sitting   Pulse: 56   Resp: 18   Temp: 36.2 °C (97.2 °F)   TempSrc: Temporal   SpO2: 97%   Weight: 65.8 kg (145 lb)   Height: 1.778 m (5' 10\")        GEN: elderly man in wheelchair. In good spirits,   CV: irregularly irregular  LUNGS: good effort, clear bilaterally, no w/r/r  EXT: no edema, cyanosis, clubbing    Exacerbation History      None    Pulmonary Function Test Results     None    Sleep Study     None    Peak Flow and ACT     N/A    Chest Radiograph     XR chest 1 view 11/12/2023    Narrative  EXAMINATION: XR ABDOMEN AP 1 VIEW, XR CHEST AP OR PA 1 VIEW, XR CHEST AP OR PA 1 VIEW 11/12/2023 01:12 PM (accession [id number]), 11/12/2023 01:09 PM (accession [id number]), 11/12/2023 01:03 PM (accession [id number])  CLINICAL HISTORY: Step 2 of [place] 2 Step process  ORDERING PROVIDER: TALYA COTTON  COMPARISON: XR CHEST AP OR PA 1 VIEW 11/12/2023, 1:12 PM, XR CHEST AP OR PA 1 VIEW 11/12/2023, 1:12 PM    FINDINGS:  The sequential films were obtained for enteric tube placement. The film at 1300 with enteric feeding tube with the tip in the superior aspect of the neoesophagus, followed by 1301 film showing the enteric feeding tube in the mid esophagus. Subsequent radiograph at 1305 shows the enteric feeding tube with the tip looped in the inferior aspect of the neoesophagus, above the diaphragm. Otherwise, the rest of the imaging findings are unchanged.    IMPRESSION:    Multiple radiographs obtained for enteric feeding tube placement with the " tip looped in the neoesophagus above the diaphragm. Given patient's surgical history and multiple prior placement/advancement attempts a fluoroscopic versus endoscopic feeding tube placement is suggested.      MACRO: None      Chest CT Scan     11/3/2023: [facility]  Pleura: Suspected right basilar calcified pleural plaques, suggestive of sequela of prior asbestos exposure.     Central Airways: Widely patent     Lungs:   *  No focal consolidation.   *  At least moderate background centrilobular emphysema. Nodular thickening is seen in the right apex with associated pleural thickening. This nodular consolidation measures 3.0 x 1.5 cm. Although this could be related to chronic pleuroparenchymal scarring, recommend comparing with prior imaging to document stability.     Nodules:   *  Juxtapleural nodularity along the anterior aspect of the right upper lobe measuring 12 x 5 mm (Series 3, Image 73).   *  Calcified granuloma in the right lower lobe.   *  Tree-in-bud opacities are seen in the lingular segment with suspected mucoid impactions. Imaging findings are suggestive of infectious/inflammatory process.   *  No obvious suspicious pulmonary nodule within the limitation of the study.     10/25/2023:  1. Postoperative changes related to prior distal esophagectomy with  gastric pull-through procedure.  2. Emphysematous changes are present. Apical pleural thickening again  noted, RIGHT greater than LEFT.  3. Trace LEFT pleural effusion, new compared prior exam.  4. 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    4/8/2022:   1.  The previously seen right apical nodule appears stable compared  to prior study and corresponding to non hypermetabolic lesion seen on  PET-CT. Moderate to severe upper lung predominant emphysema.  2. Moderate to severe coronary artery calcification.  3. Moderate to severe atherosclerotic calcification of the thoracic  and abdominal aorta. 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  4. Compression fracture of T11 vertebral body with more than 50%  height loss which is new compared to prior CT from 05/27/2021. This  is age indeterminate and recommend correlation with point tenderness  to exclude acute component of fracture.    PET/CT 6/21/2021:  1. The previously described right upper lobe mass demonstrates mild  hypermetabolic activity (SUV 2.9) and is favored to represent an  infectious/inflammatory process, however malignancy cannot be  definitively excluded.  2. No definite evidence of hypermetabolic foci to suggest a  metastatic process.  3. Small foci of hypermetabolic activity in the lateral right 4th  through 6th ribs represent sequela from prior rib fractures.  4. Displaced fracture of the right coracoid process with mild  corresponding hypermetabolic activity, likely sequela of prior trauma.    Bronchoscopy     None    Labs     Lab Results   Component Value Date    WBC 6.1 08/27/2024    HGB 14.5 08/27/2024    HCT 44.0 08/27/2024    MCV 97 08/27/2024     08/27/2024     No results found for: \"BNP\"  Lab Results   Component Value Date    EOSABS 0.19 08/27/2024         Echocardiogram     No results found for this or any previous visit from the past 365 days.         ASSESSMENT & PLAN     Problem List Items Addressed This Visit       Centrilobular emphysema (Multi) - Primary    Relevant Orders    DNR and No Intubation (Completed)    Goals of care, counseling/discussion     Other Visit Diagnoses       Abnormal CT of the chest        Relevant Orders    CT chest wo IV contrast             Summary:  84 y.o. male here presumably to have his RUL disease re-evaluated. Per previous imaging including PET/CT this appears to be an area of scarring. He continues to smoke and has no intentions of quitting. We discussed the pros/cons of repeating CT " imaging. If this truly is cancer, he has no intentions of seeking treatment, but is interested in seeing if his RUL findings have gotten any worse. Will therefore pursue a repeat CT chest.    Plan:  -CT chest now  -DNR order placed    Follow-up: after CT chest      Cuca Deleon DO  Staff Physician - Pulmonary & Critical Care  09/24/24 1:42 PM  Office number: [phone]   Fax number:  [phone]

## 2024-12-23 ENCOUNTER — APPOINTMENT (OUTPATIENT)
Dept: OPHTHALMOLOGY | Facility: CLINIC | Age: 84
End: 2024-12-23
Payer: MEDICARE

## 2025-01-01 ENCOUNTER — APPOINTMENT (OUTPATIENT)
Dept: RADIOLOGY | Facility: HOSPITAL | Age: 85
DRG: 183 | End: 2025-01-01
Payer: MEDICARE

## 2025-01-01 ENCOUNTER — APPOINTMENT (OUTPATIENT)
Dept: CARDIOLOGY | Facility: HOSPITAL | Age: 85
DRG: 183 | End: 2025-01-01
Payer: MEDICARE

## 2025-01-01 ENCOUNTER — CLINICAL SUPPORT (OUTPATIENT)
Dept: EMERGENCY MEDICINE | Facility: HOSPITAL | Age: 85
DRG: 183 | End: 2025-01-01
Payer: MEDICARE

## 2025-01-01 ENCOUNTER — HOSPITAL ENCOUNTER (INPATIENT)
Facility: HOSPITAL | Age: 85
LOS: 3 days | End: 2025-05-11
Attending: STUDENT IN AN ORGANIZED HEALTH CARE EDUCATION/TRAINING PROGRAM | Admitting: SURGERY
Payer: COMMERCIAL

## 2025-01-01 ENCOUNTER — APPOINTMENT (OUTPATIENT)
Dept: CARDIOLOGY | Facility: HOSPITAL | Age: 85
End: 2025-01-01
Payer: COMMERCIAL

## 2025-01-01 VITALS
DIASTOLIC BLOOD PRESSURE: 71 MMHG | TEMPERATURE: 97.7 F | BODY MASS INDEX: 19.25 KG/M2 | SYSTOLIC BLOOD PRESSURE: 156 MMHG | WEIGHT: 134.48 LBS | OXYGEN SATURATION: 89 % | HEIGHT: 70 IN

## 2025-01-01 DIAGNOSIS — N39.0 BACTERIAL UTI: ICD-10-CM

## 2025-01-01 DIAGNOSIS — A49.9 BACTERIAL UTI: ICD-10-CM

## 2025-01-01 DIAGNOSIS — S22.41XA CLOSED FRACTURE OF MULTIPLE RIBS OF RIGHT SIDE, INITIAL ENCOUNTER: ICD-10-CM

## 2025-01-01 DIAGNOSIS — R06.02 SHORTNESS OF BREATH: Primary | ICD-10-CM

## 2025-01-01 DIAGNOSIS — J69.0 ASPIRATION PNEUMONIA, UNSPECIFIED ASPIRATION PNEUMONIA TYPE, UNSPECIFIED LATERALITY, UNSPECIFIED PART OF LUNG (MULTI): ICD-10-CM

## 2025-01-01 DIAGNOSIS — R78.81 BACTEREMIA OF UNDETERMINED ETIOLOGY: ICD-10-CM

## 2025-01-01 LAB
ALBUMIN SERPL BCP-MCNC: 2.5 G/DL (ref 3.4–5)
ALBUMIN SERPL BCP-MCNC: 2.6 G/DL (ref 3.4–5)
ALBUMIN SERPL BCP-MCNC: 2.7 G/DL (ref 3.4–5)
ALBUMIN SERPL BCP-MCNC: 2.8 G/DL (ref 3.4–5)
ALBUMIN SERPL BCP-MCNC: 2.9 G/DL (ref 3.4–5)
ALBUMIN SERPL BCP-MCNC: 3 G/DL (ref 3.4–5)
ALBUMIN SERPL BCP-MCNC: 3.3 G/DL (ref 3.4–5)
ALBUMIN SERPL BCP-MCNC: 3.6 G/DL (ref 3.4–5)
ALP SERPL-CCNC: 46 U/L (ref 33–136)
ALP SERPL-CCNC: 73 U/L (ref 33–136)
ALT SERPL W P-5'-P-CCNC: 39 U/L (ref 10–52)
ALT SERPL W P-5'-P-CCNC: 70 U/L (ref 10–52)
AMYLASE SERPL-CCNC: 982 U/L (ref 29–103)
ANION GAP BLDA CALCULATED.4IONS-SCNC: 10 MMO/L (ref 10–25)
ANION GAP BLDA CALCULATED.4IONS-SCNC: 13 MMO/L (ref 10–25)
ANION GAP BLDA CALCULATED.4IONS-SCNC: 14 MMO/L (ref 10–25)
ANION GAP BLDA CALCULATED.4IONS-SCNC: 14 MMO/L (ref 10–25)
ANION GAP BLDA CALCULATED.4IONS-SCNC: 15 MMO/L (ref 10–25)
ANION GAP BLDV CALCULATED.4IONS-SCNC: 10 MMOL/L (ref 10–25)
ANION GAP BLDV CALCULATED.4IONS-SCNC: 11 MMOL/L (ref 10–25)
ANION GAP BLDV CALCULATED.4IONS-SCNC: 11 MMOL/L (ref 10–25)
ANION GAP BLDV CALCULATED.4IONS-SCNC: 12 MMOL/L (ref 10–25)
ANION GAP BLDV CALCULATED.4IONS-SCNC: 12 MMOL/L (ref 10–25)
ANION GAP BLDV CALCULATED.4IONS-SCNC: 15 MMOL/L (ref 10–25)
ANION GAP BLDV CALCULATED.4IONS-SCNC: 15 MMOL/L (ref 10–25)
ANION GAP SERPL CALC-SCNC: 12 MMOL/L (ref 10–20)
ANION GAP SERPL CALC-SCNC: 12 MMOL/L (ref 10–20)
ANION GAP SERPL CALC-SCNC: 15 MMOL/L (ref 10–20)
ANION GAP SERPL CALC-SCNC: 15 MMOL/L (ref 10–20)
ANION GAP SERPL CALC-SCNC: 16 MMOL/L (ref 10–20)
ANION GAP SERPL CALC-SCNC: 18 MMOL/L (ref 10–20)
ANION GAP SERPL CALC-SCNC: 20 MMOL/L (ref 10–20)
AORTIC VALVE MEAN GRADIENT: 3 MMHG
AORTIC VALVE PEAK VELOCITY: 1.39 M/S
APPEARANCE UR: CLEAR
AST SERPL W P-5'-P-CCNC: 111 U/L (ref 9–39)
AST SERPL W P-5'-P-CCNC: 28 U/L (ref 9–39)
ATRIAL RATE: 71 BPM
AV PEAK GRADIENT: 8 MMHG
AVA (PEAK VEL): 1.86 CM2
AVA (VTI): 2.04 CM2
BACTERIA BLD AEROBE CULT: ABNORMAL
BACTERIA BLD AEROBE CULT: ABNORMAL
BACTERIA BLD CULT: ABNORMAL
BACTERIA BLD CULT: ABNORMAL
BACTERIA UR CULT: NORMAL
BASE EXCESS BLDA CALC-SCNC: -10.5 MMOL/L (ref -2–3)
BASE EXCESS BLDA CALC-SCNC: -14.9 MMOL/L (ref -2–3)
BASE EXCESS BLDA CALC-SCNC: -7.4 MMOL/L (ref -2–3)
BASE EXCESS BLDA CALC-SCNC: -7.7 MMOL/L (ref -2–3)
BASE EXCESS BLDA CALC-SCNC: -8.8 MMOL/L (ref -2–3)
BASE EXCESS BLDV CALC-SCNC: -4.2 MMOL/L (ref -2–3)
BASE EXCESS BLDV CALC-SCNC: -4.7 MMOL/L (ref -2–3)
BASE EXCESS BLDV CALC-SCNC: -5.4 MMOL/L (ref -2–3)
BASE EXCESS BLDV CALC-SCNC: -5.5 MMOL/L (ref -2–3)
BASE EXCESS BLDV CALC-SCNC: -5.5 MMOL/L (ref -2–3)
BASE EXCESS BLDV CALC-SCNC: -6.3 MMOL/L (ref -2–3)
BASE EXCESS BLDV CALC-SCNC: -8.1 MMOL/L (ref -2–3)
BASOPHILS # BLD AUTO: 0.02 X10*3/UL (ref 0–0.1)
BASOPHILS # BLD AUTO: 0.02 X10*3/UL (ref 0–0.1)
BASOPHILS # BLD AUTO: 0.03 X10*3/UL (ref 0–0.1)
BASOPHILS # BLD AUTO: 0.03 X10*3/UL (ref 0–0.1)
BASOPHILS # BLD AUTO: 0.05 X10*3/UL (ref 0–0.1)
BASOPHILS NFR BLD AUTO: 0.1 %
BASOPHILS NFR BLD AUTO: 0.2 %
BASOPHILS NFR BLD AUTO: 0.4 %
BILIRUB DIRECT SERPL-MCNC: 0.1 MG/DL (ref 0–0.3)
BILIRUB SERPL-MCNC: 1.1 MG/DL (ref 0–1.2)
BILIRUB SERPL-MCNC: 1.7 MG/DL (ref 0–1.2)
BILIRUB UR STRIP.AUTO-MCNC: NEGATIVE MG/DL
BODY TEMPERATURE: 37 DEGREES CELSIUS
BUN SERPL-MCNC: 73 MG/DL (ref 6–23)
BUN SERPL-MCNC: 76 MG/DL (ref 6–23)
BUN SERPL-MCNC: 77 MG/DL (ref 6–23)
BUN SERPL-MCNC: 79 MG/DL (ref 6–23)
BUN SERPL-MCNC: 80 MG/DL (ref 6–23)
BUN SERPL-MCNC: 81 MG/DL (ref 6–23)
BUN SERPL-MCNC: 81 MG/DL (ref 6–23)
CA-I BLD-SCNC: 1.16 MMOL/L (ref 1.1–1.33)
CA-I BLD-SCNC: 1.2 MMOL/L (ref 1.1–1.33)
CA-I BLD-SCNC: 1.23 MMOL/L (ref 1.1–1.33)
CA-I BLDA-SCNC: 1.09 MMOL/L (ref 1.1–1.33)
CA-I BLDA-SCNC: 1.13 MMOL/L (ref 1.1–1.33)
CA-I BLDA-SCNC: 1.3 MMOL/L (ref 1.1–1.33)
CA-I BLDA-SCNC: 1.31 MMOL/L (ref 1.1–1.33)
CA-I BLDA-SCNC: 1.32 MMOL/L (ref 1.1–1.33)
CA-I BLDV-SCNC: 1.09 MMOL/L (ref 1.1–1.33)
CA-I BLDV-SCNC: 1.14 MMOL/L (ref 1.1–1.33)
CA-I BLDV-SCNC: 1.18 MMOL/L (ref 1.1–1.33)
CA-I BLDV-SCNC: 1.19 MMOL/L (ref 1.1–1.33)
CA-I BLDV-SCNC: 1.21 MMOL/L (ref 1.1–1.33)
CA-I BLDV-SCNC: 1.23 MMOL/L (ref 1.1–1.33)
CA-I BLDV-SCNC: 1.26 MMOL/L (ref 1.1–1.33)
CALCIUM SERPL-MCNC: 8 MG/DL (ref 8.6–10.6)
CALCIUM SERPL-MCNC: 8.3 MG/DL (ref 8.6–10.6)
CALCIUM SERPL-MCNC: 8.4 MG/DL (ref 8.6–10.6)
CALCIUM SERPL-MCNC: 8.5 MG/DL (ref 8.6–10.6)
CALCIUM SERPL-MCNC: 8.7 MG/DL (ref 8.6–10.6)
CAOX CRY #/AREA UR COMP ASSIST: ABNORMAL /HPF
CARDIAC TROPONIN I PNL SERPL HS: 34 NG/L (ref 0–53)
CHLORIDE BLDA-SCNC: 105 MMOL/L (ref 98–107)
CHLORIDE BLDA-SCNC: 107 MMOL/L (ref 98–107)
CHLORIDE BLDA-SCNC: 108 MMOL/L (ref 98–107)
CHLORIDE BLDA-SCNC: 111 MMOL/L (ref 98–107)
CHLORIDE BLDA-SCNC: 114 MMOL/L (ref 98–107)
CHLORIDE BLDV-SCNC: 101 MMOL/L (ref 98–107)
CHLORIDE BLDV-SCNC: 101 MMOL/L (ref 98–107)
CHLORIDE BLDV-SCNC: 103 MMOL/L (ref 98–107)
CHLORIDE BLDV-SCNC: 106 MMOL/L (ref 98–107)
CHLORIDE BLDV-SCNC: 107 MMOL/L (ref 98–107)
CHLORIDE SERPL-SCNC: 101 MMOL/L (ref 98–107)
CHLORIDE SERPL-SCNC: 102 MMOL/L (ref 98–107)
CHLORIDE SERPL-SCNC: 103 MMOL/L (ref 98–107)
CHLORIDE SERPL-SCNC: 103 MMOL/L (ref 98–107)
CHLORIDE SERPL-SCNC: 108 MMOL/L (ref 98–107)
CHLORIDE SERPL-SCNC: 108 MMOL/L (ref 98–107)
CHLORIDE SERPL-SCNC: 99 MMOL/L (ref 98–107)
CO2 SERPL-SCNC: 19 MMOL/L (ref 21–32)
CO2 SERPL-SCNC: 19 MMOL/L (ref 21–32)
CO2 SERPL-SCNC: 23 MMOL/L (ref 21–32)
CO2 SERPL-SCNC: 24 MMOL/L (ref 21–32)
CO2 SERPL-SCNC: 24 MMOL/L (ref 21–32)
CO2 SERPL-SCNC: 25 MMOL/L (ref 21–32)
CO2 SERPL-SCNC: 26 MMOL/L (ref 21–32)
COLOR UR: YELLOW
CREAT SERPL-MCNC: 2.81 MG/DL (ref 0.5–1.3)
CREAT SERPL-MCNC: 3.08 MG/DL (ref 0.5–1.3)
CREAT SERPL-MCNC: 3.39 MG/DL (ref 0.5–1.3)
CREAT SERPL-MCNC: 3.97 MG/DL (ref 0.5–1.3)
CREAT SERPL-MCNC: 4.29 MG/DL (ref 0.5–1.3)
CREAT SERPL-MCNC: 5.03 MG/DL (ref 0.5–1.3)
CREAT SERPL-MCNC: 5.29 MG/DL (ref 0.5–1.3)
EGFRCR SERPLBLD CKD-EPI 2021: 10 ML/MIN/1.73M*2
EGFRCR SERPLBLD CKD-EPI 2021: 11 ML/MIN/1.73M*2
EGFRCR SERPLBLD CKD-EPI 2021: 13 ML/MIN/1.73M*2
EGFRCR SERPLBLD CKD-EPI 2021: 14 ML/MIN/1.73M*2
EGFRCR SERPLBLD CKD-EPI 2021: 17 ML/MIN/1.73M*2
EGFRCR SERPLBLD CKD-EPI 2021: 19 ML/MIN/1.73M*2
EGFRCR SERPLBLD CKD-EPI 2021: 22 ML/MIN/1.73M*2
EJECTION FRACTION APICAL 4 CHAMBER: 58.3
EJECTION FRACTION: 54 %
EOSINOPHIL # BLD AUTO: 0 X10*3/UL (ref 0–0.4)
EOSINOPHIL # BLD AUTO: 0 X10*3/UL (ref 0–0.4)
EOSINOPHIL # BLD AUTO: 0.02 X10*3/UL (ref 0–0.4)
EOSINOPHIL # BLD AUTO: 0.09 X10*3/UL (ref 0–0.4)
EOSINOPHIL # BLD AUTO: 0.2 X10*3/UL (ref 0–0.4)
EOSINOPHIL NFR BLD AUTO: 0 %
EOSINOPHIL NFR BLD AUTO: 0 %
EOSINOPHIL NFR BLD AUTO: 0.2 %
EOSINOPHIL NFR BLD AUTO: 0.8 %
EOSINOPHIL NFR BLD AUTO: 1.5 %
ERYTHROCYTE [DISTWIDTH] IN BLOOD BY AUTOMATED COUNT: 14.3 % (ref 11.5–14.5)
ERYTHROCYTE [DISTWIDTH] IN BLOOD BY AUTOMATED COUNT: 14.8 % (ref 11.5–14.5)
ERYTHROCYTE [DISTWIDTH] IN BLOOD BY AUTOMATED COUNT: 15.1 % (ref 11.5–14.5)
GLUCOSE BLD MANUAL STRIP-MCNC: 127 MG/DL (ref 74–99)
GLUCOSE BLD MANUAL STRIP-MCNC: 153 MG/DL (ref 74–99)
GLUCOSE BLD MANUAL STRIP-MCNC: 74 MG/DL (ref 74–99)
GLUCOSE BLD MANUAL STRIP-MCNC: 75 MG/DL (ref 74–99)
GLUCOSE BLD MANUAL STRIP-MCNC: 79 MG/DL (ref 74–99)
GLUCOSE BLD MANUAL STRIP-MCNC: 80 MG/DL (ref 74–99)
GLUCOSE BLD MANUAL STRIP-MCNC: 86 MG/DL (ref 74–99)
GLUCOSE BLD MANUAL STRIP-MCNC: 88 MG/DL (ref 74–99)
GLUCOSE BLD MANUAL STRIP-MCNC: 93 MG/DL (ref 74–99)
GLUCOSE BLD MANUAL STRIP-MCNC: 98 MG/DL (ref 74–99)
GLUCOSE BLDA-MCNC: 100 MG/DL (ref 74–99)
GLUCOSE BLDA-MCNC: 103 MG/DL (ref 74–99)
GLUCOSE BLDA-MCNC: 134 MG/DL (ref 74–99)
GLUCOSE BLDA-MCNC: 138 MG/DL (ref 74–99)
GLUCOSE BLDA-MCNC: 96 MG/DL (ref 74–99)
GLUCOSE BLDV-MCNC: 102 MG/DL (ref 74–99)
GLUCOSE BLDV-MCNC: 124 MG/DL (ref 74–99)
GLUCOSE BLDV-MCNC: 130 MG/DL (ref 74–99)
GLUCOSE BLDV-MCNC: 86 MG/DL (ref 74–99)
GLUCOSE BLDV-MCNC: 89 MG/DL (ref 74–99)
GLUCOSE BLDV-MCNC: 95 MG/DL (ref 74–99)
GLUCOSE BLDV-MCNC: 97 MG/DL (ref 74–99)
GLUCOSE SERPL-MCNC: 108 MG/DL (ref 74–99)
GLUCOSE SERPL-MCNC: 109 MG/DL (ref 74–99)
GLUCOSE SERPL-MCNC: 125 MG/DL (ref 74–99)
GLUCOSE SERPL-MCNC: 76 MG/DL (ref 74–99)
GLUCOSE SERPL-MCNC: 82 MG/DL (ref 74–99)
GLUCOSE SERPL-MCNC: 82 MG/DL (ref 74–99)
GLUCOSE SERPL-MCNC: 96 MG/DL (ref 74–99)
GLUCOSE UR STRIP.AUTO-MCNC: NORMAL MG/DL
GRAM STN SPEC: ABNORMAL
HCO3 BLDA-SCNC: 14 MMOL/L (ref 22–26)
HCO3 BLDA-SCNC: 15.7 MMOL/L (ref 22–26)
HCO3 BLDA-SCNC: 18.4 MMOL/L (ref 22–26)
HCO3 BLDA-SCNC: 19.3 MMOL/L (ref 22–26)
HCO3 BLDA-SCNC: 21.1 MMOL/L (ref 22–26)
HCO3 BLDV-SCNC: 19.9 MMOL/L (ref 22–26)
HCO3 BLDV-SCNC: 22.4 MMOL/L (ref 22–26)
HCO3 BLDV-SCNC: 22.9 MMOL/L (ref 22–26)
HCO3 BLDV-SCNC: 22.9 MMOL/L (ref 22–26)
HCO3 BLDV-SCNC: 23 MMOL/L (ref 22–26)
HCO3 BLDV-SCNC: 23.1 MMOL/L (ref 22–26)
HCO3 BLDV-SCNC: 24.6 MMOL/L (ref 22–26)
HCT VFR BLD AUTO: 23.3 % (ref 41–52)
HCT VFR BLD AUTO: 36.4 % (ref 41–52)
HCT VFR BLD AUTO: 38.5 % (ref 41–52)
HCT VFR BLD AUTO: 41.4 % (ref 41–52)
HCT VFR BLD AUTO: 42 % (ref 41–52)
HCT VFR BLD EST: 24 % (ref 41–52)
HCT VFR BLD EST: 31 % (ref 41–52)
HCT VFR BLD EST: 33 % (ref 41–52)
HCT VFR BLD EST: 35 % (ref 41–52)
HCT VFR BLD EST: 35 % (ref 41–52)
HCT VFR BLD EST: 36 % (ref 41–52)
HCT VFR BLD EST: 40 % (ref 41–52)
HCT VFR BLD EST: 41 % (ref 41–52)
HCT VFR BLD EST: 41 % (ref 41–52)
HCT VFR BLD EST: 42 % (ref 41–52)
HCT VFR BLD EST: 46 % (ref 41–52)
HCT VFR BLD EST: 47 % (ref 41–52)
HGB BLD-MCNC: 12.5 G/DL (ref 13.5–17.5)
HGB BLD-MCNC: 12.9 G/DL (ref 13.5–17.5)
HGB BLD-MCNC: 13.4 G/DL (ref 13.5–17.5)
HGB BLD-MCNC: 14.7 G/DL (ref 13.5–17.5)
HGB BLD-MCNC: 8.2 G/DL (ref 13.5–17.5)
HGB BLDA-MCNC: 10.2 G/DL (ref 13.5–17.5)
HGB BLDA-MCNC: 11.7 G/DL (ref 13.5–17.5)
HGB BLDA-MCNC: 12.1 G/DL (ref 13.5–17.5)
HGB BLDA-MCNC: 13.4 G/DL (ref 13.5–17.5)
HGB BLDA-MCNC: 8 G/DL (ref 13.5–17.5)
HGB BLDV-MCNC: 11 G/DL (ref 13.5–17.5)
HGB BLDV-MCNC: 11.7 G/DL (ref 13.5–17.5)
HGB BLDV-MCNC: 13.5 G/DL (ref 13.5–17.5)
HGB BLDV-MCNC: 13.6 G/DL (ref 13.5–17.5)
HGB BLDV-MCNC: 14 G/DL (ref 13.5–17.5)
HGB BLDV-MCNC: 15.3 G/DL (ref 13.5–17.5)
HGB BLDV-MCNC: 15.7 G/DL (ref 13.5–17.5)
HOLD SPECIMEN: NORMAL
HYALINE CASTS #/AREA URNS AUTO: ABNORMAL /LPF
IMM GRANULOCYTES # BLD AUTO: 0.05 X10*3/UL (ref 0–0.5)
IMM GRANULOCYTES # BLD AUTO: 0.05 X10*3/UL (ref 0–0.5)
IMM GRANULOCYTES # BLD AUTO: 0.07 X10*3/UL (ref 0–0.5)
IMM GRANULOCYTES # BLD AUTO: 0.1 X10*3/UL (ref 0–0.5)
IMM GRANULOCYTES # BLD AUTO: 0.14 X10*3/UL (ref 0–0.5)
IMM GRANULOCYTES NFR BLD AUTO: 0.4 % (ref 0–0.9)
IMM GRANULOCYTES NFR BLD AUTO: 0.4 % (ref 0–0.9)
IMM GRANULOCYTES NFR BLD AUTO: 0.5 % (ref 0–0.9)
IMM GRANULOCYTES NFR BLD AUTO: 0.8 % (ref 0–0.9)
IMM GRANULOCYTES NFR BLD AUTO: 0.8 % (ref 0–0.9)
INHALED O2 CONCENTRATION: 100 %
INHALED O2 CONCENTRATION: 100 %
INHALED O2 CONCENTRATION: 36 %
INHALED O2 CONCENTRATION: 44 %
INHALED O2 CONCENTRATION: 60 %
INHALED O2 CONCENTRATION: 80 %
INHALED O2 CONCENTRATION: 90 %
KETONES UR STRIP.AUTO-MCNC: NEGATIVE MG/DL
LACTATE BLDA-SCNC: 0.8 MMOL/L (ref 0.4–2)
LACTATE BLDA-SCNC: 1.1 MMOL/L (ref 0.4–2)
LACTATE BLDA-SCNC: 1.3 MMOL/L (ref 0.4–2)
LACTATE BLDA-SCNC: 1.8 MMOL/L (ref 0.4–2)
LACTATE BLDA-SCNC: 1.8 MMOL/L (ref 0.4–2)
LACTATE BLDV-SCNC: 0.7 MMOL/L (ref 0.4–2)
LACTATE BLDV-SCNC: 0.9 MMOL/L (ref 0.4–2)
LACTATE BLDV-SCNC: 1.1 MMOL/L (ref 0.4–2)
LACTATE BLDV-SCNC: 1.4 MMOL/L (ref 0.4–2)
LACTATE BLDV-SCNC: 1.7 MMOL/L (ref 0.4–2)
LACTATE BLDV-SCNC: 1.7 MMOL/L (ref 0.4–2)
LACTATE BLDV-SCNC: 2.7 MMOL/L (ref 0.4–2)
LEFT ATRIUM VOLUME AREA LENGTH INDEX BSA: 19.9 ML/M2
LEFT VENTRICLE INTERNAL DIMENSION DIASTOLE: 3.84 CM (ref 3.5–6)
LEFT VENTRICULAR OUTFLOW TRACT DIAMETER: 1.9 CM
LEUKOCYTE ESTERASE UR QL STRIP.AUTO: ABNORMAL
LIPASE SERPL-CCNC: 178 U/L (ref 9–82)
LIPASE SERPL-CCNC: 3124 U/L (ref 9–82)
LYMPHOCYTES # BLD AUTO: 0.4 X10*3/UL (ref 0.8–3)
LYMPHOCYTES # BLD AUTO: 0.5 X10*3/UL (ref 0.8–3)
LYMPHOCYTES # BLD AUTO: 0.54 X10*3/UL (ref 0.8–3)
LYMPHOCYTES # BLD AUTO: 0.54 X10*3/UL (ref 0.8–3)
LYMPHOCYTES # BLD AUTO: 0.85 X10*3/UL (ref 0.8–3)
LYMPHOCYTES NFR BLD AUTO: 2.2 %
LYMPHOCYTES NFR BLD AUTO: 3.6 %
LYMPHOCYTES NFR BLD AUTO: 4.2 %
LYMPHOCYTES NFR BLD AUTO: 4.7 %
LYMPHOCYTES NFR BLD AUTO: 6.5 %
MAGNESIUM SERPL-MCNC: 1.87 MG/DL (ref 1.6–2.4)
MAGNESIUM SERPL-MCNC: 2.11 MG/DL (ref 1.6–2.4)
MAGNESIUM SERPL-MCNC: 2.15 MG/DL (ref 1.6–2.4)
MAGNESIUM SERPL-MCNC: 2.17 MG/DL (ref 1.6–2.4)
MAGNESIUM SERPL-MCNC: 2.31 MG/DL (ref 1.6–2.4)
MCH RBC QN AUTO: 31.4 PG (ref 26–34)
MCH RBC QN AUTO: 31.5 PG (ref 26–34)
MCH RBC QN AUTO: 31.5 PG (ref 26–34)
MCH RBC QN AUTO: 31.6 PG (ref 26–34)
MCH RBC QN AUTO: 31.8 PG (ref 26–34)
MCHC RBC AUTO-ENTMCNC: 32.4 G/DL (ref 32–36)
MCHC RBC AUTO-ENTMCNC: 33.5 G/DL (ref 32–36)
MCHC RBC AUTO-ENTMCNC: 34.3 G/DL (ref 32–36)
MCHC RBC AUTO-ENTMCNC: 35 G/DL (ref 32–36)
MCHC RBC AUTO-ENTMCNC: 35.2 G/DL (ref 32–36)
MCV RBC AUTO: 90 FL (ref 80–100)
MCV RBC AUTO: 90 FL (ref 80–100)
MCV RBC AUTO: 93 FL (ref 80–100)
MCV RBC AUTO: 94 FL (ref 80–100)
MCV RBC AUTO: 97 FL (ref 80–100)
MITRAL VALVE E/A RATIO: 0.9
MONOCYTES # BLD AUTO: 1.15 X10*3/UL (ref 0.05–0.8)
MONOCYTES # BLD AUTO: 1.27 X10*3/UL (ref 0.05–0.8)
MONOCYTES # BLD AUTO: 1.29 X10*3/UL (ref 0.05–0.8)
MONOCYTES # BLD AUTO: 1.43 X10*3/UL (ref 0.05–0.8)
MONOCYTES # BLD AUTO: 1.43 X10*3/UL (ref 0.05–0.8)
MONOCYTES NFR BLD AUTO: 11 %
MONOCYTES NFR BLD AUTO: 12.4 %
MONOCYTES NFR BLD AUTO: 7.1 %
MONOCYTES NFR BLD AUTO: 8.8 %
MONOCYTES NFR BLD AUTO: 9.3 %
MUCOUS THREADS #/AREA URNS AUTO: ABNORMAL /LPF
NEUTROPHILS # BLD AUTO: 10.8 X10*3/UL (ref 1.6–5.5)
NEUTROPHILS # BLD AUTO: 10.93 X10*3/UL (ref 1.6–5.5)
NEUTROPHILS # BLD AUTO: 11.95 X10*3/UL (ref 1.6–5.5)
NEUTROPHILS # BLD AUTO: 15.99 X10*3/UL (ref 1.6–5.5)
NEUTROPHILS # BLD AUTO: 9.39 X10*3/UL (ref 1.6–5.5)
NEUTROPHILS NFR BLD AUTO: 81.5 %
NEUTROPHILS NFR BLD AUTO: 82.2 %
NEUTROPHILS NFR BLD AUTO: 84 %
NEUTROPHILS NFR BLD AUTO: 86.2 %
NEUTROPHILS NFR BLD AUTO: 89.8 %
NITRITE UR QL STRIP.AUTO: NEGATIVE
NRBC BLD-RTO: 0 /100 WBCS (ref 0–0)
OXYHGB MFR BLDA: 87.4 % (ref 94–98)
OXYHGB MFR BLDA: 92 % (ref 94–98)
OXYHGB MFR BLDA: 93.3 % (ref 94–98)
OXYHGB MFR BLDA: 94 % (ref 94–98)
OXYHGB MFR BLDA: 95 % (ref 94–98)
OXYHGB MFR BLDV: 19.8 % (ref 45–75)
OXYHGB MFR BLDV: 27.8 % (ref 45–75)
OXYHGB MFR BLDV: 33.3 % (ref 45–75)
OXYHGB MFR BLDV: 44.4 % (ref 45–75)
OXYHGB MFR BLDV: 47.1 % (ref 45–75)
OXYHGB MFR BLDV: 49.2 % (ref 45–75)
OXYHGB MFR BLDV: 78.2 % (ref 45–75)
P AXIS: 92 DEGREES
P OFFSET: 148 MS
P ONSET: 93 MS
PCO2 BLDA: 35 MM HG (ref 38–42)
PCO2 BLDA: 44 MM HG (ref 38–42)
PCO2 BLDA: 44 MM HG (ref 38–42)
PCO2 BLDA: 46 MM HG (ref 38–42)
PCO2 BLDA: 54 MM HG (ref 38–42)
PCO2 BLDV: 51 MM HG (ref 41–51)
PCO2 BLDV: 54 MM HG (ref 41–51)
PCO2 BLDV: 55 MM HG (ref 41–51)
PCO2 BLDV: 56 MM HG (ref 41–51)
PCO2 BLDV: 60 MM HG (ref 41–51)
PH BLDA: 7.09 PH (ref 7.38–7.42)
PH BLDA: 7.2 PH (ref 7.38–7.42)
PH BLDA: 7.23 PH (ref 7.38–7.42)
PH BLDA: 7.25 PH (ref 7.38–7.42)
PH BLDA: 7.26 PH (ref 7.38–7.42)
PH BLDV: 7.2 PH (ref 7.33–7.43)
PH BLDV: 7.21 PH (ref 7.33–7.43)
PH BLDV: 7.22 PH (ref 7.33–7.43)
PH BLDV: 7.23 PH (ref 7.33–7.43)
PH BLDV: 7.24 PH (ref 7.33–7.43)
PH UR STRIP.AUTO: 6 [PH]
PHOSPHATE SERPL-MCNC: 3.7 MG/DL (ref 2.5–4.9)
PHOSPHATE SERPL-MCNC: 4.9 MG/DL (ref 2.5–4.9)
PHOSPHATE SERPL-MCNC: 5.4 MG/DL (ref 2.5–4.9)
PHOSPHATE SERPL-MCNC: 6 MG/DL (ref 2.5–4.9)
PHOSPHATE SERPL-MCNC: 6.3 MG/DL (ref 2.5–4.9)
PHOSPHATE SERPL-MCNC: 6.3 MG/DL (ref 2.5–4.9)
PLATELET # BLD AUTO: 169 X10*3/UL (ref 150–450)
PLATELET # BLD AUTO: 174 X10*3/UL (ref 150–450)
PLATELET # BLD AUTO: 178 X10*3/UL (ref 150–450)
PLATELET # BLD AUTO: 195 X10*3/UL (ref 150–450)
PLATELET # BLD AUTO: 196 X10*3/UL (ref 150–450)
PO2 BLDA: 56 MM HG (ref 85–95)
PO2 BLDA: 67 MM HG (ref 85–95)
PO2 BLDA: 70 MM HG (ref 85–95)
PO2 BLDA: 72 MM HG (ref 85–95)
PO2 BLDA: 83 MM HG (ref 85–95)
PO2 BLDV: 15 MM HG (ref 35–45)
PO2 BLDV: 21 MM HG (ref 35–45)
PO2 BLDV: 23 MM HG (ref 35–45)
PO2 BLDV: 28 MM HG (ref 35–45)
PO2 BLDV: 30 MM HG (ref 35–45)
PO2 BLDV: 31 MM HG (ref 35–45)
PO2 BLDV: 48 MM HG (ref 35–45)
POTASSIUM BLDA-SCNC: 3.3 MMOL/L (ref 3.5–5.3)
POTASSIUM BLDA-SCNC: 4.1 MMOL/L (ref 3.5–5.3)
POTASSIUM BLDA-SCNC: 4.1 MMOL/L (ref 3.5–5.3)
POTASSIUM BLDA-SCNC: 4.3 MMOL/L (ref 3.5–5.3)
POTASSIUM BLDA-SCNC: 4.6 MMOL/L (ref 3.5–5.3)
POTASSIUM BLDV-SCNC: 3.5 MMOL/L (ref 3.5–5.3)
POTASSIUM BLDV-SCNC: 4 MMOL/L (ref 3.5–5.3)
POTASSIUM BLDV-SCNC: 4.1 MMOL/L (ref 3.5–5.3)
POTASSIUM BLDV-SCNC: 4.2 MMOL/L (ref 3.5–5.3)
POTASSIUM BLDV-SCNC: 4.3 MMOL/L (ref 3.5–5.3)
POTASSIUM BLDV-SCNC: 4.4 MMOL/L (ref 3.5–5.3)
POTASSIUM BLDV-SCNC: 6.5 MMOL/L (ref 3.5–5.3)
POTASSIUM SERPL-SCNC: 3.5 MMOL/L (ref 3.5–5.3)
POTASSIUM SERPL-SCNC: 4 MMOL/L (ref 3.5–5.3)
POTASSIUM SERPL-SCNC: 4.1 MMOL/L (ref 3.5–5.3)
POTASSIUM SERPL-SCNC: 4.3 MMOL/L (ref 3.5–5.3)
POTASSIUM SERPL-SCNC: 4.8 MMOL/L (ref 3.5–5.3)
PR INTERVAL: 264 MS
PROT SERPL-MCNC: 6.2 G/DL (ref 6.4–8.2)
PROT SERPL-MCNC: 7.6 G/DL (ref 6.4–8.2)
PROT UR STRIP.AUTO-MCNC: ABNORMAL MG/DL
Q ONSET: 225 MS
QRS COUNT: 11 BEATS
QRS DURATION: 90 MS
QT INTERVAL: 418 MS
QTC CALCULATION(BAZETT): 454 MS
QTC FREDERICIA: 442 MS
R AXIS: -66 DEGREES
RBC # BLD AUTO: 2.6 X10*6/UL (ref 4.5–5.9)
RBC # BLD AUTO: 3.93 X10*6/UL (ref 4.5–5.9)
RBC # BLD AUTO: 4.1 X10*6/UL (ref 4.5–5.9)
RBC # BLD AUTO: 4.27 X10*6/UL (ref 4.5–5.9)
RBC # BLD AUTO: 4.65 X10*6/UL (ref 4.5–5.9)
RBC # UR STRIP.AUTO: ABNORMAL MG/DL
RBC #/AREA URNS AUTO: >20 /HPF
RIGHT VENTRICLE FREE WALL PEAK S': 14 CM/S
RIGHT VENTRICLE PEAK SYSTOLIC PRESSURE: 39 MMHG
SAO2 % BLDA: 90 % (ref 94–100)
SAO2 % BLDA: 94 % (ref 94–100)
SAO2 % BLDA: 95 % (ref 94–100)
SAO2 % BLDA: 96 % (ref 94–100)
SAO2 % BLDA: 97 % (ref 94–100)
SAO2 % BLDV: 20 % (ref 45–75)
SAO2 % BLDV: 28 % (ref 45–75)
SAO2 % BLDV: 34 % (ref 45–75)
SAO2 % BLDV: 45 % (ref 45–75)
SAO2 % BLDV: 48 % (ref 45–75)
SAO2 % BLDV: 50 % (ref 45–75)
SAO2 % BLDV: 80 % (ref 45–75)
SODIUM BLDA-SCNC: 132 MMOL/L (ref 136–145)
SODIUM BLDA-SCNC: 135 MMOL/L (ref 136–145)
SODIUM BLDA-SCNC: 137 MMOL/L (ref 136–145)
SODIUM BLDA-SCNC: 138 MMOL/L (ref 136–145)
SODIUM BLDA-SCNC: 138 MMOL/L (ref 136–145)
SODIUM BLDV-SCNC: 132 MMOL/L (ref 136–145)
SODIUM BLDV-SCNC: 133 MMOL/L (ref 136–145)
SODIUM BLDV-SCNC: 133 MMOL/L (ref 136–145)
SODIUM BLDV-SCNC: 134 MMOL/L (ref 136–145)
SODIUM BLDV-SCNC: 134 MMOL/L (ref 136–145)
SODIUM BLDV-SCNC: 136 MMOL/L (ref 136–145)
SODIUM BLDV-SCNC: 136 MMOL/L (ref 136–145)
SODIUM SERPL-SCNC: 135 MMOL/L (ref 136–145)
SODIUM SERPL-SCNC: 136 MMOL/L (ref 136–145)
SODIUM SERPL-SCNC: 137 MMOL/L (ref 136–145)
SODIUM SERPL-SCNC: 138 MMOL/L (ref 136–145)
SODIUM SERPL-SCNC: 139 MMOL/L (ref 136–145)
SP GR UR STRIP.AUTO: 1.02
SQUAMOUS #/AREA URNS AUTO: ABNORMAL /HPF
STAPHYLOCOCCUS SPEC CULT: NORMAL
T AXIS: 20 DEGREES
T OFFSET: 434 MS
TRICUSPID ANNULAR PLANE SYSTOLIC EXCURSION: 2.3 CM
TRIGL SERPL-MCNC: 69 MG/DL (ref 0–149)
UROBILINOGEN UR STRIP.AUTO-MCNC: ABNORMAL MG/DL
VANCOMYCIN SERPL-MCNC: 14.8 UG/ML (ref 5–20)
VENTRICULAR RATE: 71 BPM
WBC # BLD AUTO: 11.5 X10*3/UL (ref 4.4–11.3)
WBC # BLD AUTO: 13 X10*3/UL (ref 4.4–11.3)
WBC # BLD AUTO: 13.1 X10*3/UL (ref 4.4–11.3)
WBC # BLD AUTO: 13.9 X10*3/UL (ref 4.4–11.3)
WBC # BLD AUTO: 17.8 X10*3/UL (ref 4.4–11.3)
WBC #/AREA URNS AUTO: ABNORMAL /HPF

## 2025-01-01 PROCEDURE — 37799 UNLISTED PX VASCULAR SURGERY: CPT | Performed by: STUDENT IN AN ORGANIZED HEALTH CARE EDUCATION/TRAINING PROGRAM

## 2025-01-01 PROCEDURE — 84100 ASSAY OF PHOSPHORUS: CPT | Performed by: PHYSICIAN ASSISTANT

## 2025-01-01 PROCEDURE — 2500000001 HC RX 250 WO HCPCS SELF ADMINISTERED DRUGS (ALT 637 FOR MEDICARE OP): Mod: SE | Performed by: PHYSICIAN ASSISTANT

## 2025-01-01 PROCEDURE — 2500000004 HC RX 250 GENERAL PHARMACY W/ HCPCS (ALT 636 FOR OP/ED): Mod: JZ,SE | Performed by: STUDENT IN AN ORGANIZED HEALTH CARE EDUCATION/TRAINING PROGRAM

## 2025-01-01 PROCEDURE — 83735 ASSAY OF MAGNESIUM: CPT | Performed by: PHYSICIAN ASSISTANT

## 2025-01-01 PROCEDURE — 2020000001 HC ICU ROOM DAILY

## 2025-01-01 PROCEDURE — 84132 ASSAY OF SERUM POTASSIUM: CPT

## 2025-01-01 PROCEDURE — 82150 ASSAY OF AMYLASE: CPT | Performed by: PHYSICIAN ASSISTANT

## 2025-01-01 PROCEDURE — 82040 ASSAY OF SERUM ALBUMIN: CPT | Performed by: PHYSICIAN ASSISTANT

## 2025-01-01 PROCEDURE — 93306 TTE W/DOPPLER COMPLETE: CPT | Performed by: INTERNAL MEDICINE

## 2025-01-01 PROCEDURE — 82947 ASSAY GLUCOSE BLOOD QUANT: CPT

## 2025-01-01 PROCEDURE — 71045 X-RAY EXAM CHEST 1 VIEW: CPT | Performed by: RADIOLOGY

## 2025-01-01 PROCEDURE — 3E033XZ INTRODUCTION OF VASOPRESSOR INTO PERIPHERAL VEIN, PERCUTANEOUS APPROACH: ICD-10-PCS | Performed by: NURSE PRACTITIONER

## 2025-01-01 PROCEDURE — XXE2X19 MEASUREMENT OF CARDIAC OUTPUT, COMPUTER-AIDED ASSESSMENT, NEW TECHNOLOGY GROUP 9: ICD-10-PCS | Performed by: STUDENT IN AN ORGANIZED HEALTH CARE EDUCATION/TRAINING PROGRAM

## 2025-01-01 PROCEDURE — 2500000004 HC RX 250 GENERAL PHARMACY W/ HCPCS (ALT 636 FOR OP/ED): Mod: SE,JZ | Performed by: STUDENT IN AN ORGANIZED HEALTH CARE EDUCATION/TRAINING PROGRAM

## 2025-01-01 PROCEDURE — 93010 ELECTROCARDIOGRAM REPORT: CPT | Performed by: STUDENT IN AN ORGANIZED HEALTH CARE EDUCATION/TRAINING PROGRAM

## 2025-01-01 PROCEDURE — 99232 SBSQ HOSP IP/OBS MODERATE 35: CPT

## 2025-01-01 PROCEDURE — 31720 CLEARANCE OF AIRWAYS: CPT

## 2025-01-01 PROCEDURE — 99231 SBSQ HOSP IP/OBS SF/LOW 25: CPT

## 2025-01-01 PROCEDURE — 2500000002 HC RX 250 W HCPCS SELF ADMINISTERED DRUGS (ALT 637 FOR MEDICARE OP, ALT 636 FOR OP/ED): Mod: SE

## 2025-01-01 PROCEDURE — 81001 URINALYSIS AUTO W/SCOPE: CPT

## 2025-01-01 PROCEDURE — 84132 ASSAY OF SERUM POTASSIUM: CPT | Performed by: PHYSICIAN ASSISTANT

## 2025-01-01 PROCEDURE — 85025 COMPLETE CBC W/AUTO DIFF WBC: CPT | Performed by: PHYSICIAN ASSISTANT

## 2025-01-01 PROCEDURE — 97166 OT EVAL MOD COMPLEX 45 MIN: CPT | Mod: GO

## 2025-01-01 PROCEDURE — 71045 X-RAY EXAM CHEST 1 VIEW: CPT | Performed by: STUDENT IN AN ORGANIZED HEALTH CARE EDUCATION/TRAINING PROGRAM

## 2025-01-01 PROCEDURE — 36415 COLL VENOUS BLD VENIPUNCTURE: CPT | Performed by: STUDENT IN AN ORGANIZED HEALTH CARE EDUCATION/TRAINING PROGRAM

## 2025-01-01 PROCEDURE — 83690 ASSAY OF LIPASE: CPT

## 2025-01-01 PROCEDURE — 2500000005 HC RX 250 GENERAL PHARMACY W/O HCPCS: Mod: SE | Performed by: PHARMACIST

## 2025-01-01 PROCEDURE — 99291 CRITICAL CARE FIRST HOUR: CPT | Performed by: SURGERY

## 2025-01-01 PROCEDURE — G0390 TRAUMA RESPONS W/HOSP CRITI: HCPCS

## 2025-01-01 PROCEDURE — 2500000005 HC RX 250 GENERAL PHARMACY W/O HCPCS: Mod: SE | Performed by: PHYSICIAN ASSISTANT

## 2025-01-01 PROCEDURE — 84484 ASSAY OF TROPONIN QUANT: CPT

## 2025-01-01 PROCEDURE — 74176 CT ABD & PELVIS W/O CONTRAST: CPT | Performed by: RADIOLOGY

## 2025-01-01 PROCEDURE — 36556 INSERT NON-TUNNEL CV CATH: CPT | Performed by: NURSE PRACTITIONER

## 2025-01-01 PROCEDURE — 2500000004 HC RX 250 GENERAL PHARMACY W/ HCPCS (ALT 636 FOR OP/ED): Mod: SE,JZ

## 2025-01-01 PROCEDURE — 82330 ASSAY OF CALCIUM: CPT

## 2025-01-01 PROCEDURE — 2500000005 HC RX 250 GENERAL PHARMACY W/O HCPCS: Mod: SE

## 2025-01-01 PROCEDURE — 2500000004 HC RX 250 GENERAL PHARMACY W/ HCPCS (ALT 636 FOR OP/ED): Mod: JZ,SE | Performed by: PHYSICIAN ASSISTANT

## 2025-01-01 PROCEDURE — 94640 AIRWAY INHALATION TREATMENT: CPT

## 2025-01-01 PROCEDURE — P9045 ALBUMIN (HUMAN), 5%, 250 ML: HCPCS | Mod: JZ,SE

## 2025-01-01 PROCEDURE — 99285 EMERGENCY DEPT VISIT HI MDM: CPT | Mod: 25 | Performed by: STUDENT IN AN ORGANIZED HEALTH CARE EDUCATION/TRAINING PROGRAM

## 2025-01-01 PROCEDURE — 99223 1ST HOSP IP/OBS HIGH 75: CPT | Performed by: STUDENT IN AN ORGANIZED HEALTH CARE EDUCATION/TRAINING PROGRAM

## 2025-01-01 PROCEDURE — 71045 X-RAY EXAM CHEST 1 VIEW: CPT

## 2025-01-01 PROCEDURE — 36415 COLL VENOUS BLD VENIPUNCTURE: CPT

## 2025-01-01 PROCEDURE — 87040 BLOOD CULTURE FOR BACTERIA: CPT | Performed by: STUDENT IN AN ORGANIZED HEALTH CARE EDUCATION/TRAINING PROGRAM

## 2025-01-01 PROCEDURE — 70450 CT HEAD/BRAIN W/O DYE: CPT | Performed by: RADIOLOGY

## 2025-01-01 PROCEDURE — 84132 ASSAY OF SERUM POTASSIUM: CPT | Performed by: STUDENT IN AN ORGANIZED HEALTH CARE EDUCATION/TRAINING PROGRAM

## 2025-01-01 PROCEDURE — 74176 CT ABD & PELVIS W/O CONTRAST: CPT

## 2025-01-01 PROCEDURE — 94667 MNPJ CHEST WALL 1ST: CPT

## 2025-01-01 PROCEDURE — 83690 ASSAY OF LIPASE: CPT | Performed by: PHYSICIAN ASSISTANT

## 2025-01-01 PROCEDURE — 72128 CT CHEST SPINE W/O DYE: CPT | Performed by: RADIOLOGY

## 2025-01-01 PROCEDURE — 80069 RENAL FUNCTION PANEL: CPT | Performed by: PHYSICIAN ASSISTANT

## 2025-01-01 PROCEDURE — 72125 CT NECK SPINE W/O DYE: CPT | Performed by: RADIOLOGY

## 2025-01-01 PROCEDURE — 83735 ASSAY OF MAGNESIUM: CPT

## 2025-01-01 PROCEDURE — 2500000004 HC RX 250 GENERAL PHARMACY W/ HCPCS (ALT 636 FOR OP/ED): Mod: JZ,SE

## 2025-01-01 PROCEDURE — 99291 CRITICAL CARE FIRST HOUR: CPT

## 2025-01-01 PROCEDURE — 0932T N-INVS DET HRT FAIL AUG ECHO: CPT

## 2025-01-01 PROCEDURE — 80202 ASSAY OF VANCOMYCIN: CPT | Performed by: STUDENT IN AN ORGANIZED HEALTH CARE EDUCATION/TRAINING PROGRAM

## 2025-01-01 PROCEDURE — 76937 US GUIDE VASCULAR ACCESS: CPT

## 2025-01-01 PROCEDURE — 36415 COLL VENOUS BLD VENIPUNCTURE: CPT | Performed by: PHYSICIAN ASSISTANT

## 2025-01-01 PROCEDURE — 2500000002 HC RX 250 W HCPCS SELF ADMINISTERED DRUGS (ALT 637 FOR MEDICARE OP, ALT 636 FOR OP/ED): Mod: SE | Performed by: PHYSICIAN ASSISTANT

## 2025-01-01 PROCEDURE — 71250 CT THORAX DX C-: CPT | Performed by: RADIOLOGY

## 2025-01-01 PROCEDURE — 93306 TTE W/DOPPLER COMPLETE: CPT

## 2025-01-01 PROCEDURE — 84478 ASSAY OF TRIGLYCERIDES: CPT | Performed by: NURSE PRACTITIONER

## 2025-01-01 PROCEDURE — 99223 1ST HOSP IP/OBS HIGH 75: CPT

## 2025-01-01 PROCEDURE — 2500000001 HC RX 250 WO HCPCS SELF ADMINISTERED DRUGS (ALT 637 FOR MEDICARE OP): Mod: SE

## 2025-01-01 PROCEDURE — 85025 COMPLETE CBC W/AUTO DIFF WBC: CPT

## 2025-01-01 PROCEDURE — 82330 ASSAY OF CALCIUM: CPT | Performed by: STUDENT IN AN ORGANIZED HEALTH CARE EDUCATION/TRAINING PROGRAM

## 2025-01-01 PROCEDURE — 36620 INSERTION CATHETER ARTERY: CPT | Performed by: NURSE PRACTITIONER

## 2025-01-01 PROCEDURE — 99291 CRITICAL CARE FIRST HOUR: CPT | Performed by: PHYSICIAN ASSISTANT

## 2025-01-01 PROCEDURE — 94762 N-INVAS EAR/PLS OXIMTRY CONT: CPT

## 2025-01-01 PROCEDURE — 36415 COLL VENOUS BLD VENIPUNCTURE: CPT | Performed by: NURSE PRACTITIONER

## 2025-01-01 PROCEDURE — 82330 ASSAY OF CALCIUM: CPT | Performed by: PHYSICIAN ASSISTANT

## 2025-01-01 PROCEDURE — 2500000002 HC RX 250 W HCPCS SELF ADMINISTERED DRUGS (ALT 637 FOR MEDICARE OP, ALT 636 FOR OP/ED): Mod: JZ,SE

## 2025-01-01 PROCEDURE — 2500000005 HC RX 250 GENERAL PHARMACY W/O HCPCS: Mod: SE | Performed by: NURSE PRACTITIONER

## 2025-01-01 PROCEDURE — 2500000004 HC RX 250 GENERAL PHARMACY W/ HCPCS (ALT 636 FOR OP/ED): Mod: SE | Performed by: PHYSICIAN ASSISTANT

## 2025-01-01 PROCEDURE — 97162 PT EVAL MOD COMPLEX 30 MIN: CPT | Mod: GP | Performed by: PHYSICAL THERAPIST

## 2025-01-01 PROCEDURE — 02HV33Z INSERTION OF INFUSION DEVICE INTO SUPERIOR VENA CAVA, PERCUTANEOUS APPROACH: ICD-10-PCS | Performed by: NURSE PRACTITIONER

## 2025-01-01 PROCEDURE — 2500000002 HC RX 250 W HCPCS SELF ADMINISTERED DRUGS (ALT 637 FOR MEDICARE OP, ALT 636 FOR OP/ED): Mod: SE | Performed by: STUDENT IN AN ORGANIZED HEALTH CARE EDUCATION/TRAINING PROGRAM

## 2025-01-01 PROCEDURE — 82248 BILIRUBIN DIRECT: CPT | Performed by: NURSE PRACTITIONER

## 2025-01-01 PROCEDURE — 80053 COMPREHEN METABOLIC PANEL: CPT

## 2025-01-01 PROCEDURE — 2500000004 HC RX 250 GENERAL PHARMACY W/ HCPCS (ALT 636 FOR OP/ED): Mod: SE,JZ | Performed by: PHARMACIST

## 2025-01-01 PROCEDURE — 87086 URINE CULTURE/COLONY COUNT: CPT

## 2025-01-01 PROCEDURE — G0545 PR INHERENT VISIT TO INPT: HCPCS | Performed by: STUDENT IN AN ORGANIZED HEALTH CARE EDUCATION/TRAINING PROGRAM

## 2025-01-01 PROCEDURE — 72131 CT LUMBAR SPINE W/O DYE: CPT | Performed by: RADIOLOGY

## 2025-01-01 PROCEDURE — 84075 ASSAY ALKALINE PHOSPHATASE: CPT | Performed by: NURSE PRACTITIONER

## 2025-01-01 PROCEDURE — 99285 EMERGENCY DEPT VISIT HI MDM: CPT | Performed by: STUDENT IN AN ORGANIZED HEALTH CARE EDUCATION/TRAINING PROGRAM

## 2025-01-01 PROCEDURE — 72125 CT NECK SPINE W/O DYE: CPT

## 2025-01-01 PROCEDURE — 87081 CULTURE SCREEN ONLY: CPT

## 2025-01-01 PROCEDURE — 70450 CT HEAD/BRAIN W/O DYE: CPT

## 2025-01-01 PROCEDURE — 87077 CULTURE AEROBIC IDENTIFY: CPT | Performed by: STUDENT IN AN ORGANIZED HEALTH CARE EDUCATION/TRAINING PROGRAM

## 2025-01-01 PROCEDURE — 93005 ELECTROCARDIOGRAM TRACING: CPT

## 2025-01-01 PROCEDURE — 96366 THER/PROPH/DIAG IV INF ADDON: CPT

## 2025-01-01 PROCEDURE — 96365 THER/PROPH/DIAG IV INF INIT: CPT

## 2025-01-01 RX ORDER — METOPROLOL TARTRATE 1 MG/ML
5 INJECTION, SOLUTION INTRAVENOUS ONCE
Status: DISCONTINUED | OUTPATIENT
Start: 2025-01-01 | End: 2025-01-01

## 2025-01-01 RX ORDER — NOREPINEPHRINE BITARTRATE/D5W 8 MG/250ML
.01-1 PLASTIC BAG, INJECTION (ML) INTRAVENOUS CONTINUOUS
Status: DISCONTINUED | OUTPATIENT
Start: 2025-01-01 | End: 2025-01-01 | Stop reason: HOSPADM

## 2025-01-01 RX ORDER — NOREPINEPHRINE BITARTRATE/D5W 8 MG/250ML
PLASTIC BAG, INJECTION (ML) INTRAVENOUS
Status: DISPENSED
Start: 2025-01-01 | End: 2025-01-01

## 2025-01-01 RX ORDER — VANCOMYCIN HYDROCHLORIDE 1 G/20ML
INJECTION, POWDER, LYOPHILIZED, FOR SOLUTION INTRAVENOUS DAILY PRN
Status: DISCONTINUED | OUTPATIENT
Start: 2025-01-01 | End: 2025-01-01

## 2025-01-01 RX ORDER — OXYCODONE HYDROCHLORIDE 5 MG/1
5 TABLET ORAL EVERY 4 HOURS PRN
Refills: 0 | Status: DISCONTINUED | OUTPATIENT
Start: 2025-01-01 | End: 2025-01-01 | Stop reason: HOSPADM

## 2025-01-01 RX ORDER — POTASSIUM CHLORIDE 20 MEQ/1
40 TABLET, EXTENDED RELEASE ORAL ONCE
Status: COMPLETED | OUTPATIENT
Start: 2025-01-01 | End: 2025-01-01

## 2025-01-01 RX ORDER — DEXTROSE 50 % IN WATER (D50W) INTRAVENOUS SYRINGE
12.5
Status: DISCONTINUED | OUTPATIENT
Start: 2025-01-01 | End: 2025-01-01 | Stop reason: HOSPADM

## 2025-01-01 RX ORDER — METOPROLOL TARTRATE 1 MG/ML
5 INJECTION, SOLUTION INTRAVENOUS EVERY 6 HOURS
Status: DISCONTINUED | OUTPATIENT
Start: 2025-01-01 | End: 2025-01-01

## 2025-01-01 RX ORDER — LIDOCAINE 560 MG/1
1 PATCH PERCUTANEOUS; TOPICAL; TRANSDERMAL DAILY
Status: DISCONTINUED | OUTPATIENT
Start: 2025-01-01 | End: 2025-01-01 | Stop reason: HOSPADM

## 2025-01-01 RX ORDER — SODIUM CHLORIDE, SODIUM LACTATE, POTASSIUM CHLORIDE, CALCIUM CHLORIDE 600; 310; 30; 20 MG/100ML; MG/100ML; MG/100ML; MG/100ML
75 INJECTION, SOLUTION INTRAVENOUS CONTINUOUS
Status: ACTIVE | OUTPATIENT
Start: 2025-01-01 | End: 2025-01-01

## 2025-01-01 RX ORDER — METOPROLOL TARTRATE 50 MG/1
50 TABLET ORAL 2 TIMES DAILY
Status: DISCONTINUED | OUTPATIENT
Start: 2025-01-01 | End: 2025-01-01

## 2025-01-01 RX ORDER — AMIODARONE HYDROCHLORIDE 200 MG/1
200 TABLET ORAL DAILY
Status: DISCONTINUED | OUTPATIENT
Start: 2025-01-01 | End: 2025-01-01 | Stop reason: HOSPADM

## 2025-01-01 RX ORDER — ACETAMINOPHEN 10 MG/ML
1000 INJECTION, SOLUTION INTRAVENOUS EVERY 6 HOURS
Status: DISCONTINUED | OUTPATIENT
Start: 2025-01-01 | End: 2025-01-01 | Stop reason: HOSPADM

## 2025-01-01 RX ORDER — OXYCODONE HYDROCHLORIDE 10 MG/1
10 TABLET ORAL EVERY 4 HOURS PRN
Refills: 0 | Status: DISCONTINUED | OUTPATIENT
Start: 2025-01-01 | End: 2025-01-01 | Stop reason: HOSPADM

## 2025-01-01 RX ORDER — ACETAMINOPHEN 10 MG/ML
1000 INJECTION, SOLUTION INTRAVENOUS EVERY 6 HOURS
Status: COMPLETED | OUTPATIENT
Start: 2025-01-01 | End: 2025-01-01

## 2025-01-01 RX ORDER — DEXTROSE 50 % IN WATER (D50W) INTRAVENOUS SYRINGE
25
Status: DISCONTINUED | OUTPATIENT
Start: 2025-01-01 | End: 2025-01-01 | Stop reason: HOSPADM

## 2025-01-01 RX ORDER — POLYETHYLENE GLYCOL 3350 17 G/17G
17 POWDER, FOR SOLUTION ORAL DAILY PRN
Status: DISCONTINUED | OUTPATIENT
Start: 2025-01-01 | End: 2025-01-01 | Stop reason: HOSPADM

## 2025-01-01 RX ORDER — SODIUM CHLORIDE, SODIUM LACTATE, POTASSIUM CHLORIDE, CALCIUM CHLORIDE 600; 310; 30; 20 MG/100ML; MG/100ML; MG/100ML; MG/100ML
75 INJECTION, SOLUTION INTRAVENOUS CONTINUOUS
Status: DISCONTINUED | OUTPATIENT
Start: 2025-01-01 | End: 2025-01-01 | Stop reason: HOSPADM

## 2025-01-01 RX ORDER — ONDANSETRON 4 MG/1
4 TABLET, ORALLY DISINTEGRATING ORAL EVERY 6 HOURS PRN
COMMUNITY

## 2025-01-01 RX ORDER — LIDOCAINE 560 MG/1
1 PATCH PERCUTANEOUS; TOPICAL; TRANSDERMAL EVERY 12 HOURS PRN
COMMUNITY

## 2025-01-01 RX ORDER — IPRATROPIUM BROMIDE AND ALBUTEROL SULFATE 2.5; .5 MG/3ML; MG/3ML
3 SOLUTION RESPIRATORY (INHALATION) EVERY 20 MIN
Status: DISCONTINUED | OUTPATIENT
Start: 2025-01-01 | End: 2025-01-01

## 2025-01-01 RX ORDER — HEPARIN SODIUM 5000 [USP'U]/ML
5000 INJECTION, SOLUTION INTRAVENOUS; SUBCUTANEOUS EVERY 8 HOURS SCHEDULED
Status: DISCONTINUED | OUTPATIENT
Start: 2025-01-01 | End: 2025-01-01 | Stop reason: HOSPADM

## 2025-01-01 RX ORDER — ACETAMINOPHEN 325 MG/1
975 TABLET ORAL EVERY 8 HOURS SCHEDULED
Status: DISCONTINUED | OUTPATIENT
Start: 2025-01-01 | End: 2025-01-01

## 2025-01-01 RX ORDER — IPRATROPIUM BROMIDE AND ALBUTEROL SULFATE 2.5; .5 MG/3ML; MG/3ML
3 SOLUTION RESPIRATORY (INHALATION)
Status: DISCONTINUED | OUTPATIENT
Start: 2025-01-01 | End: 2025-01-01 | Stop reason: HOSPADM

## 2025-01-01 RX ORDER — ALBUMIN HUMAN 50 G/1000ML
12.5 SOLUTION INTRAVENOUS ONCE
Status: COMPLETED | OUTPATIENT
Start: 2025-01-01 | End: 2025-01-01

## 2025-01-01 RX ORDER — VANCOMYCIN HYDROCHLORIDE 500 MG/100ML
500 INJECTION, SOLUTION INTRAVENOUS ONCE
Status: COMPLETED | OUTPATIENT
Start: 2025-01-01 | End: 2025-01-01

## 2025-01-01 RX ORDER — METOPROLOL TARTRATE 25 MG/1
25 TABLET, FILM COATED ORAL 2 TIMES DAILY
Status: DISCONTINUED | OUTPATIENT
Start: 2025-01-01 | End: 2025-01-01

## 2025-01-01 RX ORDER — ROPIVACAINE IN 0.9% SOD CHL/PF 0.2 %
10 PLASTIC BAG, INJECTION (ML) EPIDURAL CONTINUOUS
Status: DISCONTINUED | OUTPATIENT
Start: 2025-01-01 | End: 2025-01-01 | Stop reason: HOSPADM

## 2025-01-01 RX ORDER — SODIUM CHLORIDE, SODIUM LACTATE, POTASSIUM CHLORIDE, CALCIUM CHLORIDE 600; 310; 30; 20 MG/100ML; MG/100ML; MG/100ML; MG/100ML
100 INJECTION, SOLUTION INTRAVENOUS CONTINUOUS
Status: ACTIVE | OUTPATIENT
Start: 2025-01-01 | End: 2025-01-01

## 2025-01-01 RX ORDER — ALBUMIN HUMAN 50 G/1000ML
SOLUTION INTRAVENOUS
Status: COMPLETED
Start: 2025-01-01 | End: 2025-01-01

## 2025-01-01 RX ORDER — SODIUM BICARBONATE 1 MEQ/ML
50 SYRINGE (ML) INTRAVENOUS ONCE
Status: DISCONTINUED | OUTPATIENT
Start: 2025-01-01 | End: 2025-01-01 | Stop reason: HOSPADM

## 2025-01-01 RX ORDER — ALBUMIN HUMAN 50 G/1000ML
SOLUTION INTRAVENOUS
Status: DISPENSED
Start: 2025-01-01 | End: 2025-01-01

## 2025-01-01 RX ORDER — SODIUM CHLORIDE FOR INHALATION 3 %
3 VIAL, NEBULIZER (ML) INHALATION EVERY 6 HOURS SCHEDULED
Status: DISCONTINUED | OUTPATIENT
Start: 2025-01-01 | End: 2025-01-01 | Stop reason: HOSPADM

## 2025-01-01 RX ORDER — CALCIUM CHLORIDE INJECTION 100 MG/ML
INJECTION, SOLUTION INTRAVENOUS
Status: DISCONTINUED
Start: 2025-01-01 | End: 2025-01-01 | Stop reason: HOSPADM

## 2025-01-01 RX ORDER — SENNOSIDES 8.6 MG/1
1 TABLET ORAL 2 TIMES DAILY
Status: DISCONTINUED | OUTPATIENT
Start: 2025-01-01 | End: 2025-01-01 | Stop reason: HOSPADM

## 2025-01-01 RX ADMIN — SODIUM CHLORIDE, SODIUM LACTATE, POTASSIUM CHLORIDE, AND CALCIUM CHLORIDE 1000 ML: 600; 310; 30; 20 INJECTION, SOLUTION INTRAVENOUS at 01:47

## 2025-01-01 RX ADMIN — SENNOSIDES 8.6 MG: 8.6 TABLET, FILM COATED ORAL at 13:32

## 2025-01-01 RX ADMIN — SODIUM CHLORIDE SOLN NEBU 3% 3 ML: 3 NEBU SOLN at 01:40

## 2025-01-01 RX ADMIN — IPRATROPIUM BROMIDE AND ALBUTEROL SULFATE 3 ML: .5; 3 SOLUTION RESPIRATORY (INHALATION) at 08:03

## 2025-01-01 RX ADMIN — AMIODARONE HYDROCHLORIDE 200 MG: 200 TABLET ORAL at 08:30

## 2025-01-01 RX ADMIN — SODIUM CHLORIDE SOLN NEBU 3% 3 ML: 3 NEBU SOLN at 20:25

## 2025-01-01 RX ADMIN — Medication 4 L/MIN: at 20:05

## 2025-01-01 RX ADMIN — PIPERACILLIN SODIUM AND TAZOBACTAM SODIUM 2.25 G: 2; .25 INJECTION, SOLUTION INTRAVENOUS at 18:08

## 2025-01-01 RX ADMIN — IPRATROPIUM BROMIDE AND ALBUTEROL SULFATE 3 ML: .5; 3 SOLUTION RESPIRATORY (INHALATION) at 11:52

## 2025-01-01 RX ADMIN — HEPARIN SODIUM 5000 UNITS: 5000 INJECTION, SOLUTION INTRAVENOUS; SUBCUTANEOUS at 13:26

## 2025-01-01 RX ADMIN — SODIUM CHLORIDE, SODIUM LACTATE, POTASSIUM CHLORIDE, AND CALCIUM CHLORIDE 500 ML: .6; .31; .03; .02 INJECTION, SOLUTION INTRAVENOUS at 20:32

## 2025-01-01 RX ADMIN — POTASSIUM CHLORIDE 40 MEQ: 1500 TABLET, EXTENDED RELEASE ORAL at 05:43

## 2025-01-01 RX ADMIN — HYDROMORPHONE HYDROCHLORIDE 0.2 MG: 1 INJECTION, SOLUTION INTRAMUSCULAR; INTRAVENOUS; SUBCUTANEOUS at 05:44

## 2025-01-01 RX ADMIN — HEPARIN SODIUM 5000 UNITS: 5000 INJECTION, SOLUTION INTRAVENOUS; SUBCUTANEOUS at 07:44

## 2025-01-01 RX ADMIN — ACETAMINOPHEN 1000 MG: 1000 INJECTION, SOLUTION INTRAVENOUS at 13:26

## 2025-01-01 RX ADMIN — NOREPINEPHRINE BITARTRATE 1 MCG/KG/MIN: 8 INJECTION, SOLUTION INTRAVENOUS at 01:50

## 2025-01-01 RX ADMIN — SODIUM CHLORIDE, SODIUM LACTATE, POTASSIUM CHLORIDE, AND CALCIUM CHLORIDE 1000 ML: 600; 310; 30; 20 INJECTION, SOLUTION INTRAVENOUS at 10:47

## 2025-01-01 RX ADMIN — ALBUMIN HUMAN 12.5 G: 0.05 INJECTION, SOLUTION INTRAVENOUS at 00:57

## 2025-01-01 RX ADMIN — ACETAMINOPHEN 975 MG: 325 TABLET, FILM COATED ORAL at 21:00

## 2025-01-01 RX ADMIN — ACETAMINOPHEN 1000 MG: 1000 INJECTION, SOLUTION INTRAVENOUS at 23:04

## 2025-01-01 RX ADMIN — PIPERACILLIN SODIUM AND TAZOBACTAM SODIUM 2.25 G: 2; .25 INJECTION, SOLUTION INTRAVENOUS at 07:43

## 2025-01-01 RX ADMIN — PIPERACILLIN SODIUM AND TAZOBACTAM SODIUM 2.25 G: 2; .25 INJECTION, SOLUTION INTRAVENOUS at 06:22

## 2025-01-01 RX ADMIN — NOREPINEPHRINE BITARTRATE 0.05 MCG/KG/MIN: 8 INJECTION, SOLUTION INTRAVENOUS at 20:19

## 2025-01-01 RX ADMIN — ACETAMINOPHEN 975 MG: 325 TABLET, FILM COATED ORAL at 13:32

## 2025-01-01 RX ADMIN — HEPARIN SODIUM 5000 UNITS: 5000 INJECTION, SOLUTION INTRAVENOUS; SUBCUTANEOUS at 13:32

## 2025-01-01 RX ADMIN — SODIUM CHLORIDE, SODIUM LACTATE, POTASSIUM CHLORIDE, AND CALCIUM CHLORIDE 75 ML/HR: .6; .31; .03; .02 INJECTION, SOLUTION INTRAVENOUS at 13:41

## 2025-01-01 RX ADMIN — IPRATROPIUM BROMIDE AND ALBUTEROL SULFATE 3 ML: .5; 3 SOLUTION RESPIRATORY (INHALATION) at 14:54

## 2025-01-01 RX ADMIN — AMIODARONE HYDROCHLORIDE 200 MG: 200 TABLET ORAL at 11:52

## 2025-01-01 RX ADMIN — SODIUM CHLORIDE, SODIUM LACTATE, POTASSIUM CHLORIDE, AND CALCIUM CHLORIDE 500 ML: .6; .31; .03; .02 INJECTION, SOLUTION INTRAVENOUS at 13:38

## 2025-01-01 RX ADMIN — ACETAMINOPHEN 1000 MG: 1000 INJECTION, SOLUTION INTRAVENOUS at 18:08

## 2025-01-01 RX ADMIN — SODIUM CHLORIDE, SODIUM LACTATE, POTASSIUM CHLORIDE, AND CALCIUM CHLORIDE 75 ML/HR: .6; .31; .03; .02 INJECTION, SOLUTION INTRAVENOUS at 14:02

## 2025-01-01 RX ADMIN — ACETAMINOPHEN 975 MG: 325 TABLET, FILM COATED ORAL at 14:16

## 2025-01-01 RX ADMIN — PIPERACILLIN SODIUM AND TAZOBACTAM SODIUM 2.25 G: 2; .25 INJECTION, SOLUTION INTRAVENOUS at 15:06

## 2025-01-01 RX ADMIN — VANCOMYCIN HYDROCHLORIDE 500 MG: 500 INJECTION, SOLUTION INTRAVENOUS at 13:37

## 2025-01-01 RX ADMIN — Medication 15 L/MIN: at 01:25

## 2025-01-01 RX ADMIN — PIPERACILLIN SODIUM AND TAZOBACTAM SODIUM 2.25 G: 2; .25 INJECTION, SOLUTION INTRAVENOUS at 18:30

## 2025-01-01 RX ADMIN — ACETAMINOPHEN 975 MG: 325 TABLET, FILM COATED ORAL at 05:43

## 2025-01-01 RX ADMIN — HEPARIN SODIUM 5000 UNITS: 5000 INJECTION, SOLUTION INTRAVENOUS; SUBCUTANEOUS at 23:20

## 2025-01-01 RX ADMIN — VANCOMYCIN HYDROCHLORIDE 1750 MG: 5 INJECTION, POWDER, LYOPHILIZED, FOR SOLUTION INTRAVENOUS at 15:31

## 2025-01-01 RX ADMIN — HEPARIN SODIUM 5000 UNITS: 5000 INJECTION, SOLUTION INTRAVENOUS; SUBCUTANEOUS at 21:00

## 2025-01-01 RX ADMIN — SODIUM CHLORIDE SOLN NEBU 3% 3 ML: 3 NEBU SOLN at 14:54

## 2025-01-01 RX ADMIN — IPRATROPIUM BROMIDE AND ALBUTEROL SULFATE 3 ML: .5; 3 SOLUTION RESPIRATORY (INHALATION) at 00:35

## 2025-01-01 RX ADMIN — ALBUMIN HUMAN 12.5 G: 0.05 INJECTION, SOLUTION INTRAVENOUS at 19:54

## 2025-01-01 RX ADMIN — SODIUM CHLORIDE SOLN NEBU 3% 3 ML: 3 NEBU SOLN at 15:06

## 2025-01-01 RX ADMIN — ACETAMINOPHEN 1000 MG: 10 INJECTION, SOLUTION INTRAVENOUS at 00:55

## 2025-01-01 RX ADMIN — METOPROLOL TARTRATE 50 MG: 50 TABLET, FILM COATED ORAL at 11:52

## 2025-01-01 RX ADMIN — SENNOSIDES 8.6 MG: 8.6 TABLET, FILM COATED ORAL at 20:57

## 2025-01-01 RX ADMIN — SODIUM CHLORIDE SOLN NEBU 3% 3 ML: 3 NEBU SOLN at 09:48

## 2025-01-01 RX ADMIN — SODIUM CHLORIDE, SODIUM LACTATE, POTASSIUM CHLORIDE, AND CALCIUM CHLORIDE 100 ML/HR: .6; .31; .03; .02 INJECTION, SOLUTION INTRAVENOUS at 09:31

## 2025-01-01 RX ADMIN — PIPERACILLIN SODIUM AND TAZOBACTAM SODIUM 2.25 G: 2; .25 INJECTION, SOLUTION INTRAVENOUS at 23:19

## 2025-01-01 RX ADMIN — METOPROLOL TARTRATE 5 MG: 1 INJECTION, SOLUTION INTRAVENOUS at 13:37

## 2025-01-01 RX ADMIN — LIDOCAINE 1 PATCH: 4 PATCH TOPICAL at 11:22

## 2025-01-01 RX ADMIN — HEPARIN SODIUM 5000 UNITS: 5000 INJECTION, SOLUTION INTRAVENOUS; SUBCUTANEOUS at 14:16

## 2025-01-01 RX ADMIN — INSULIN HUMAN 2 UNITS: 100 INJECTION, SOLUTION PARENTERAL at 07:46

## 2025-01-01 RX ADMIN — PIPERACILLIN SODIUM AND TAZOBACTAM SODIUM 2.25 G: 2; .25 INJECTION, SOLUTION INTRAVENOUS at 03:12

## 2025-01-01 RX ADMIN — METOPROLOL TARTRATE 50 MG: 50 TABLET, FILM COATED ORAL at 08:30

## 2025-01-01 RX ADMIN — METOPROLOL TARTRATE 5 MG: 1 INJECTION, SOLUTION INTRAVENOUS at 18:08

## 2025-01-01 RX ADMIN — PIPERACILLIN SODIUM AND TAZOBACTAM SODIUM 3.38 G: 3; .375 INJECTION, SOLUTION INTRAVENOUS at 06:55

## 2025-01-01 RX ADMIN — SODIUM CHLORIDE SOLN NEBU 3% 3 ML: 3 NEBU SOLN at 02:28

## 2025-01-01 RX ADMIN — SODIUM CHLORIDE SOLN NEBU 3% 3 ML: 3 NEBU SOLN at 08:03

## 2025-01-01 RX ADMIN — VASOPRESSIN 0.03 UNITS/MIN: 0.2 INJECTION INTRAVENOUS at 00:38

## 2025-01-01 RX ADMIN — PIPERACILLIN SODIUM AND TAZOBACTAM SODIUM 2.25 G: 2; .25 INJECTION, SOLUTION INTRAVENOUS at 02:50

## 2025-01-01 RX ADMIN — Medication 6 L/MIN: at 01:40

## 2025-01-01 RX ADMIN — SODIUM CHLORIDE, SODIUM LACTATE, POTASSIUM CHLORIDE, AND CALCIUM CHLORIDE 75 ML/HR: .6; .31; .03; .02 INJECTION, SOLUTION INTRAVENOUS at 13:38

## 2025-01-01 RX ADMIN — SENNOSIDES 8.6 MG: 8.6 TABLET, FILM COATED ORAL at 08:30

## 2025-01-01 RX ADMIN — IPRATROPIUM BROMIDE AND ALBUTEROL SULFATE 3 ML: .5; 3 SOLUTION RESPIRATORY (INHALATION) at 11:21

## 2025-01-01 RX ADMIN — HEPARIN SODIUM 5000 UNITS: 5000 INJECTION, SOLUTION INTRAVENOUS; SUBCUTANEOUS at 23:04

## 2025-01-01 RX ADMIN — ACETAMINOPHEN 1000 MG: 1000 INJECTION, SOLUTION INTRAVENOUS at 07:11

## 2025-01-01 RX ADMIN — PIPERACILLIN SODIUM AND TAZOBACTAM SODIUM 2.25 G: 2; .25 INJECTION, SOLUTION INTRAVENOUS at 12:23

## 2025-01-01 RX ADMIN — SODIUM CHLORIDE SOLN NEBU 3% 3 ML: 3 NEBU SOLN at 20:05

## 2025-01-01 RX ADMIN — PIPERACILLIN SODIUM AND TAZOBACTAM SODIUM 2.25 G: 2; .25 INJECTION, SOLUTION INTRAVENOUS at 13:26

## 2025-01-01 ASSESSMENT — PAIN SCALES - GENERAL
PAINLEVEL_OUTOF10: 0 - NO PAIN
PAINLEVEL_OUTOF10: 5 - MODERATE PAIN
PAINLEVEL_OUTOF10: 0 - NO PAIN

## 2025-01-01 ASSESSMENT — LIFESTYLE VARIABLES
EVER FELT BAD OR GUILTY ABOUT YOUR DRINKING: NO
HAVE PEOPLE ANNOYED YOU BY CRITICIZING YOUR DRINKING: NO
HAVE YOU EVER FELT YOU SHOULD CUT DOWN ON YOUR DRINKING: NO
EVER HAD A DRINK FIRST THING IN THE MORNING TO STEADY YOUR NERVES TO GET RID OF A HANGOVER: NO
TOTAL SCORE: 0

## 2025-01-01 ASSESSMENT — ACTIVITIES OF DAILY LIVING (ADL)
BATHING: NEEDS ASSISTANCE
PATIENT'S MEMORY ADEQUATE TO SAFELY COMPLETE DAILY ACTIVITIES?: UNABLE TO ASSESS
GROOMING: INDEPENDENT
TOILETING: INDEPENDENT
DRESSING YOURSELF: INDEPENDENT
FEEDING YOURSELF: INDEPENDENT
BATHING_ASSISTANCE: MODERATE
JUDGMENT_ADEQUATE_SAFELY_COMPLETE_DAILY_ACTIVITIES: UNABLE TO ASSESS

## 2025-01-01 ASSESSMENT — COGNITIVE AND FUNCTIONAL STATUS - GENERAL
MOVING FROM LYING ON BACK TO SITTING ON SIDE OF FLAT BED WITH BEDRAILS: TOTAL
STANDING UP FROM CHAIR USING ARMS: A LOT
CLIMB 3 TO 5 STEPS WITH RAILING: TOTAL
MOVING FROM LYING ON BACK TO SITTING ON SIDE OF FLAT BED WITH BEDRAILS: A LOT
DRESSING REGULAR UPPER BODY CLOTHING: A LITTLE
MOVING TO AND FROM BED TO CHAIR: A LOT
TOILETING: A LOT
DAILY ACTIVITIY SCORE: 15
DRESSING REGULAR LOWER BODY CLOTHING: A LOT
WALKING IN HOSPITAL ROOM: TOTAL
EATING MEALS: A LITTLE
PERSONAL GROOMING: A LITTLE
HELP NEEDED FOR BATHING: A LOT
MOBILITY SCORE: 10
TURNING FROM BACK TO SIDE WHILE IN FLAT BAD: A LOT

## 2025-01-01 ASSESSMENT — ENCOUNTER SYMPTOMS
PALPITATIONS: 0
AGITATION: 0
SHORTNESS OF BREATH: 1
FREQUENCY: 0
FEVER: 0
EYE REDNESS: 0
WOUND: 0
EYE PAIN: 0
WHEEZING: 0
DIZZINESS: 0
BACK PAIN: 0
FLANK PAIN: 1
HEADACHES: 0
NECK PAIN: 0
ABDOMINAL PAIN: 0
SORE THROAT: 0
CHILLS: 0
VOMITING: 0

## 2025-01-01 ASSESSMENT — PAIN DESCRIPTION - LOCATION: LOCATION: BACK

## 2025-03-20 ENCOUNTER — APPOINTMENT (OUTPATIENT)
Dept: OPHTHALMOLOGY | Facility: CLINIC | Age: 85
End: 2025-03-20
Payer: MEDICARE

## 2025-03-20 DIAGNOSIS — H52.11 MYOPIA OF RIGHT EYE: ICD-10-CM

## 2025-03-20 DIAGNOSIS — H25.13 AGE-RELATED NUCLEAR CATARACT OF BOTH EYES: ICD-10-CM

## 2025-03-20 DIAGNOSIS — H52.4 ASTIGMATISM OF BOTH EYES WITH PRESBYOPIA: Primary | ICD-10-CM

## 2025-03-20 DIAGNOSIS — H52.203 ASTIGMATISM OF BOTH EYES WITH PRESBYOPIA: Primary | ICD-10-CM

## 2025-03-20 DIAGNOSIS — H52.02 HYPEROPIA OF LEFT EYE: ICD-10-CM

## 2025-03-20 DIAGNOSIS — H35.89 OTHER SPECIFIED RETINAL DISORDERS: ICD-10-CM

## 2025-03-20 PROCEDURE — 92015 DETERMINE REFRACTIVE STATE: CPT | Performed by: OPTOMETRIST

## 2025-03-20 PROCEDURE — 92134 CPTRZ OPH DX IMG PST SGM RTA: CPT | Performed by: OPTOMETRIST

## 2025-03-20 PROCEDURE — 92004 COMPRE OPH EXAM NEW PT 1/>: CPT | Performed by: OPTOMETRIST

## 2025-03-20 ASSESSMENT — VISUAL ACUITY
METHOD: SNELLEN - LINEAR
OD_SC: 20/125-1
OD_PH_SC: 20/60+2
OS_SC: 20/40-1

## 2025-03-20 ASSESSMENT — CONF VISUAL FIELD
OD_SUPERIOR_TEMPORAL_RESTRICTION: 0
OS_INFERIOR_TEMPORAL_RESTRICTION: 0
OS_SUPERIOR_NASAL_RESTRICTION: 0
OD_INFERIOR_TEMPORAL_RESTRICTION: 0
OS_INFERIOR_NASAL_RESTRICTION: 0
OD_SUPERIOR_NASAL_RESTRICTION: 0
OS_SUPERIOR_TEMPORAL_RESTRICTION: 0
OS_NORMAL: 1
OD_INFERIOR_NASAL_RESTRICTION: 0
OD_NORMAL: 1

## 2025-03-20 ASSESSMENT — REFRACTION_MANIFEST
OS_CYLINDER: -3.00
OD_AXIS: 130
OD_SPHERE: -2.00
OS_SPHERE: +1.25
OD_AXIS: 130
METHOD_AUTOREFRACTION: 1
OS_CYLINDER: -2.75
OS_AXIS: 080
OS_AXIS: 080
OD_CYLINDER: -1.00
OD_SPHERE: -2.00
OD_CYLINDER: -1.00
OD_ADD: +3.50
OS_SPHERE: +1.25
OS_ADD: +3.50

## 2025-03-20 ASSESSMENT — TONOMETRY
IOP_METHOD: GOLDMANN APPLANATION
OS_IOP_MMHG: 08
OD_IOP_MMHG: 08

## 2025-03-20 ASSESSMENT — CUP TO DISC RATIO
OD_RATIO: 0.65
OS_RATIO: 0.65

## 2025-03-20 ASSESSMENT — SLIT LAMP EXAM - LIDS
COMMENTS: GOOD POSITION
COMMENTS: GOOD POSITION

## 2025-03-20 ASSESSMENT — EXTERNAL EXAM - RIGHT EYE: OD_EXAM: NORMAL

## 2025-03-20 ASSESSMENT — EXTERNAL EXAM - LEFT EYE: OS_EXAM: NORMAL

## 2025-03-20 NOTE — ASSESSMENT & PLAN NOTE
Moderate Rx. Pt states he has bifocal Rx but presents without anything today. Checking refraction after dilation showed reduced BCVA - will go with initial Rx found.  Pt educated on findings. Release Rx for FTW. Discussed adaptation. Monitor annually or sooner if reporting difficulty with Rx. Pt voiced understanding.

## 2025-03-20 NOTE — PROGRESS NOTES
Assessment/Plan   Problem List Items Addressed This Visit       Age-related nuclear cataract of both eyes     2.5+ NS OU.  Mildly visually significant.  Pt educated on findings and importance of UV protection when outdoors. Defer surgical referral at this time. Monitor annually. Pt voiced understanding.         Hyperopia of left eye     See astigmatism/presbyopia.         Myopia of right eye     See astigmatism/presbyopia.         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 Pt states he has bifocal Rx but presents without anything today. Checking refraction after dilation showed reduced BCVA - will go with initial Rx found.  Pt educated on findings. Release Rx for FTW. Discussed adaptation. Monitor annually or sooner if reporting difficulty with Rx. Pt voiced understanding.            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 Possibly from previous trauma vs toxoplasmosis vs histoplasmosis. MacOCT today shows stable scarring from 2019 exam and OCT.   Pt educated on findings. Monitor annually with DFE and macOCT or sooner if reporting sudden changes to vision. Pt voiced understanding.          Relevant Orders    OCT, Retina - OU - Both Eyes (Completed)

## 2025-03-20 NOTE — ASSESSMENT & PLAN NOTE
2.5+ NS OU.  Mildly visually significant.  Pt educated on findings and importance of UV protection when outdoors. Defer surgical referral at this time. Monitor annually. Pt voiced understanding.

## 2025-03-20 NOTE — ASSESSMENT & PLAN NOTE
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 Possibly from previous trauma vs toxoplasmosis vs histoplasmosis. MacOCT today shows stable scarring from 2019 exam and OCT.   Pt educated on findings. Monitor annually with DFE and macOCT or sooner if reporting sudden changes to vision. Pt voiced understanding.

## 2025-05-08 PROBLEM — R06.02 SHORTNESS OF BREATH: Status: ACTIVE | Noted: 2025-01-01

## 2025-05-08 NOTE — H&P
[facility]  TRAUMA SERVICE - CONSULT    Patient Name: Florentin Anderson  MRN: [id number]  Admit Date: 508  : 1940  AGE: 85 y.o.   GENDER: male  ==============================================================================  MECHANISM OF INJURY / CHIEF COMPLAINT:   85M with hx COPD and tobacco use presents from SNF with shortness of breath and nausea (currently resolved). Had multiple weeks of mechanical falls, most recent 3-4 days ago. Found to have multiple right sided rib fractures, prompting trauma consult. Does c/o trouble breathing with new oxygen requirement, associated R sided chest pain. +brief LOC, no blood thinners.     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 Penetrating atheromatous ulcer    ==============================================================================  ADMISSION PLAN OF CARE:    ## Rib fxs, hx COPD, possible CAP  - multimodal pain control with sched tylenol, lidoderm, dilaudid pushes for now  - Acute pain consult for RAYMOND block, start chemoppx after  - SpO2 >87% goal, wean from 3L O2 as able  - poor IS usage <500, tid bronchial hygiene for now  - abx per below    ## UTI  - day #1 zosyn, fu urine cx    ## pancreatitis, asymptomatic  - clears for now  - LR 1L bolus    ## julieta on ckd, creat 1.6   - mIVF in addition to bolus  - voiding yellow urine, improving acidosis  - lytes stable, recheck creat     ## hx afib  - home amio, metop    Dispo: Trauma ICU admission for close respiratory monitoring    Nael Asif PA-C    Discussed with chief Hooker and Dr. Simpson  ==============================================================================  PAST MEDICAL HISTORY:   PMH: see above  Medical History[1]  Last menstrual period:  n/a    PSH: see above  Surgical History[2]  FH: denies  Family History[3]  SOCIAL HISTORY:    Smoking: daily Tobacco Use History[4]    Alcohol: denies   Social History     Substance and Sexual Activity   Alcohol Use Never       Drug use: denies    MEDICATIONS:   Prior to Admission medications    Medication Sig Start Date End Date Taking? Authorizing Provider   cyanocobalamin (Vitamin B-12) 500 mcg tablet Take 2 tablets (1,000 mcg) by mouth once daily.   Yes Historical Provider, MD   amiodarone (Pacerone) 200 mg tablet Take 1 tablet (200 mg) by mouth once daily.    Historical Provider, MD   lidocaine 4 % patch Place 1 patch on the skin every 12 hours if needed (lower back pain).    Historical Provider, MD   metoprolol tartrate (Lopressor) 50 mg tablet Take 1 tablet by mouth 2 times a day. Hold if SBP < 100 and HR < 50    Historical Provider, MD   multivitamin with minerals tablet Take 1 tablet by mouth once daily.    Historical Provider, MD   ondansetron ODT (Zofran-ODT) 4 mg disintegrating tablet Dissolve 1 tablet (4 mg) in the mouth every 6 hours if needed for nausea or vomiting.    Historical Provider, MD   traMADol (Ultram) 50 mg tablet Take 1 tablet (50 mg) by mouth 2 times a day.    Historical Provider, MD   aspirin 81 mg EC tablet Take 1 tablet (81 mg) by mouth once daily.  5/8/25  Historical Provider, MD   ibuprofen 200 mg tablet Take 1 tablet (200 mg) by mouth every 6 hours if needed for mild pain (1 - 3).  5/8/25  Historical Provider, MD     ALLERGIES:   RX Allergies[5]    REVIEW OF SYSTEMS:  Review of Systems   Constitutional:  Negative for chills and fever.   HENT:  Negative for ear pain and sore throat.    Eyes:  Negative for pain and redness.   Respiratory:  Positive for shortness of breath. Negative for wheezing.    Cardiovascular:  Positive for chest pain. Negative for palpitations.   Gastrointestinal:  Negative for abdominal pain and vomiting.   Genitourinary:  Positive for flank pain. Negative for  frequency.   Musculoskeletal:  Negative for back pain and neck pain.   Skin:  Negative for rash and wound.   Neurological:  Negative for dizziness and headaches.   Psychiatric/Behavioral:  Negative for agitation and behavioral problems.      PHYSICAL EXAM:  NEURO: A&O x3, GCS 15, CN II-XII intact, NAVARRO equally, muscle strength 5/5 distally x4, no sensory deficits  HEAD: NC/AT, No lacerations or abrasions, no bony step offs, midface stable.  EENT: PERRL, EOMI. external ear without laceration. Nasal septum midline, no crepitus or septal hematoma. Oral mucosa and tongue without lacerations, edentulous  NECK: No cervical spine tenderness or step offs, no lacerations or abrasions, trachea midline. No JVD.  RESPIRATORY/CHEST: No abrasions, contusions, crepitus. +R posterolateral lower CWT. Non-labored, equal chest expansion, CTAB, no W/R/R. Midline scar   CV: Rate controlled rhythm. Pulses bilateral: 2+ radial, 2+DP, 2+PT  ABDOMEN: soft, nontender, nondistended. No scars, abrasions or lacerations. Scar midline  PELVIS: Stable to compression.  BACK/SPINE: No thoracic midline tenderness, step-offs or deformities. No lumbar midline tenderness, step-offs, or deformities.  No abrasions, hematomas or lacerations noted.  EXTREMITIES: No edema or cyanosis. Nml ROM w/o pain. No deformities, lacerations or contusions.   IMAGING SUMMARY:  (summary of findings, not a copy of dictation)  Pan scan without contrast with above findings    LABS:  Results from last 7 days   Lab Units 05/08/25  0147   WBC AUTO x10*3/uL 13.9*   HEMOGLOBIN g/dL 14.7   HEMATOCRIT % 42.0   PLATELETS AUTO x10*3/uL 196   NEUTROS PCT AUTO % 86.2   LYMPHS PCT AUTO % 3.6   MONOS PCT AUTO % 9.3   EOS PCT AUTO % 0.0         Results from last 7 days   Lab Units 05/08/25  0147   SODIUM mmol/L 136   POTASSIUM mmol/L 4.1   CHLORIDE mmol/L 101   CO2 mmol/L 19*   BUN mg/dL 73*   CREATININE mg/dL 5.29*   CALCIUM mg/dL 8.5*   PROTEIN TOTAL g/dL 7.6   BILIRUBIN TOTAL mg/dL 1.7*    ALK PHOS U/L 73   ALT U/L 39   AST U/L 28   GLUCOSE mg/dL 125*     Results from last 7 days   Lab Units 05/08/25  0147   BILIRUBIN TOTAL mg/dL 1.7*           I have reviewed all laboratory and imaging results ordered/pertinent for this encounter.          [1]   Past Medical History:  Diagnosis Date    Closed fracture of distal clavicle 10/25/2023    Giant cell arteritis (Multi) 10/25/2023    Macula scars of posterior pole (postinflammatory) (post-traumatic), right eye 07/10/2019    Macular scar of right eye    Mass of right lung 10/25/2023    Paroxysmal atrial fibrillation (Multi)     Rib fracture 10/25/2023    UTI (urinary tract infection)    [2]   Past Surgical History:  Procedure Laterality Date    CHEST SURGERY      patient does not recall the details   [3]   Family History  Adopted: Yes   Problem Relation Name Age of Onset    Heart attack Mother      Heart attack Father     [4]   Social History  Tobacco Use   Smoking Status Every Day    Current packs/day: 0.50    Average packs/day: 0.5 packs/day for 60.0 years (30.0 ttl pk-yrs)    Types: Cigarettes   Smokeless Tobacco Never   [5] No Known Allergies

## 2025-05-08 NOTE — PROGRESS NOTES
"Pharmacy Medication History Review    Florentin Anderson is a 85 y.o. male admitted for No Principal Problem: There is no principal problem currently on the Problem List. Please update the Problem List and refresh.. Pharmacy reviewed the patient's [id number] medications and allergies for accuracy.    Medications ADDED:  Lidocaine 4% Patch   Ondansetron ODT 4 mg tablet   Medications CHANGED:  Tramadol 50 mg tablet - updated sig from\" 1 tab po Q8H PRN to 1 tab po BID\"  Medications REMOVED:   Aspirin 81 mg EC tablet   Ibuprofen 200 mg tablet     The list below reflects the updated PTA list.   Prior to Admission Medications   Prescriptions Last Dose Informant   amiodarone (Pacerone) 200 mg tablet Unknown Other   Sig: Take 1 tablet (200 mg) by mouth once daily.   cyanocobalamin (Vitamin B-12) 500 mcg tablet Unknown Other   Sig: Take 2 tablets (1,000 mcg) by mouth once daily.   lidocaine 4 % patch Unknown Other   Sig: Place 1 patch on the skin every 12 hours if needed (lower back pain).   metoprolol tartrate (Lopressor) 50 mg tablet Unknown Other   Sig: Take 1 tablet by mouth 2 times a day. Hold if SBP < 100 and HR < 50   multivitamin with minerals tablet Unknown Other   Sig: Take 1 tablet by mouth once daily.   ondansetron ODT (Zofran-ODT) 4 mg disintegrating tablet Unknown Other   Sig: Dissolve 1 tablet (4 mg) in the mouth every 6 hours if needed for nausea or vomiting.   traMADol (Ultram) 50 mg tablet Unknown Other   Sig: Take 1 tablet (50 mg) by mouth 2 times a day.      Facility-Administered Medications: None        The list below reflects the updated allergy list. Please review each documented allergy for additional clarification and justification.  Allergies  Reviewed by Belgica Dudley on 5/8/2025   No Known Allergies         Patient was unable to be assessed for M2B at discharge.     Sources:   [facility] Active Orders Summary generated on 05/08/2025  [facility] medication dispense hx report   [facility] chart review " "   admission med rec grid   CE     Additional Comments:  Patients PTA list has been completed based upon [facility] Active Order Summary sheet generated on 05/08/2025. Any changes , removals or updates made to the patients PTA list have been made in order to directly reflect the order summary sheet received from [facility] facility.       Belgica Dudley  Pharmacy Technician  05/08/25     Secure Chat preferred   If no response call [phone] or Vocera \"Med Rec\"   "

## 2025-05-08 NOTE — ED TRIAGE NOTES
Pt with history of COPD, lung cancer, current smoker presents from nursing facility with complaints of nausea, shortness of breath, and new demand for O2; pt was found to have high kidney labs, hypotension as well as need for nonrebreather mask

## 2025-05-08 NOTE — ED PROVIDER NOTES
CC: Shortness of Breath     HPI:   Patient is a 85-year-old male with past medical history of translatory's, recurrent syncope, chronic lung mass, A-fib presenting from his nursing facility due to concerns of acute renal failure based on outpatient lab work.  Patient endorses that he has not had a bowel movement in a few days and is having lower abdominal pain without radiation to the back and endorses passing gas.  He does have evidence of past surgical scars.  It is difficult to understand the patient due to soft voice.  He was notably hypoxic per EMS and satting in the 80s and were unable to get him up past 90% on a nonrebreather.  Patient denies any falls, has a nonproductive cough I denies any recent fevers or chills.  He does endorse vomiting without any bilious emesis.  Patient does not have significant tenderness upon palpation of his abdomen does not have any rebound or guarding.  Patient does not believe he has any active lung problems at this time.  He denies taking any anticoagulation denies any history of DVT or PE.    Limitations to History: soft voice  Additional History Obtained from: EMS    PMHx/PSHx:  Per HPI.   - has a past medical history of Closed fracture of distal clavicle (10/25/2023), Giant cell arteritis (Multi) (10/25/2023), Macula scars of posterior pole (postinflammatory) (post-traumatic), right eye (07/10/2019), Mass of right lung (10/25/2023), Paroxysmal atrial fibrillation (Multi), Rib fracture (10/25/2023), and UTI (urinary tract infection).  - has a past surgical history that includes Chest surgery.    Social History:  - Tobacco:  reports that he has been smoking cigarettes. He has a 30 pack-year smoking history. He has never used smokeless tobacco.   - Alcohol:  reports no history of alcohol use.   - Drugs:  reports no history of drug use.     Medications: Reviewed in EMR.     Allergies:  Patient has no known  allergies.    ???????????????????????????????????????????????????????????????  Triage Vitals:  T 36.2 °C (97.2 °F)  HR 71  /61  RR 16  O2 100 %      Physical Exam  Constitutional:       Comments: Weak voice, cachetic appearing   HENT:      Head: Normocephalic.      Comments: Minor old abrasion on forehead  Eyes:      Extraocular Movements: Extraocular movements intact.      Pupils: Pupils are equal, round, and reactive to light.   Neck:      Vascular: No JVD.   Cardiovascular:      Rate and Rhythm: Normal rate and regular rhythm.      Heart sounds: No murmur heard.     No friction rub. No gallop.   Pulmonary:      Breath sounds: No wheezing.      Comments: Shallow breathing with rattling cough that is not productive on 4L NC  Chest:      Chest wall: No tenderness.   Abdominal:      Palpations: Abdomen is soft.      Tenderness: There is abdominal tenderness (on lower abdomen without rebound or guarding).   Musculoskeletal:         General: No swelling or tenderness.      Cervical back: Normal range of motion and neck supple.   Lymphadenopathy:      Cervical: No cervical adenopathy.   Skin:     General: Skin is warm and dry.      Capillary Refill: Capillary refill takes less than 2 seconds.   Neurological:      Mental Status: He is alert and oriented to person, place, and time.      Sensory: No sensory deficit.      Motor: No weakness.       ???????????????????????????????????????????????????????????????  EKG (per my interpretation):  Sinus rhythm with a rate of 71.  TN prolonged at 264 indicative of first-degree block.  No QRS prolongation.  No obvious ST elevations or depressions noted.  Poor baseline in multiple leads limiting EKG interpretation.  No obvious CLYDE.  QTc 454    ED Course  ED Course as of 05/10/25 0817   Thu May 08, 2025   6424 IMPRESSION:  1. Mild pancreatic enlargement and fat stranding within the anterior  pararenal space suggestive of acute pancreatitis. Correlation with  lipase would be  recommended. No thick-walled fluid collections are  evident. Evaluation limited on noncontrast exam.  2. Right posterolateral 8th through 11th mildly displaced rib  fractures.  3. Scattered mucous plugging within the right middle lobe with  associated ground-glass opacities and consolidative opacities.  Findings are compatible with atelectasis with developing aspiration  pneumonitis. A component of contusion can not be excluded given  nearby rib fractures.  4. Postoperative changes of esophagectomy with gastric pull-through  with fat stranding and indistinct haziness of the conduit suggestive  of infectious/inflammatory etiology.  5. 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  6. Please refer to same-day CT thoracic and lumbar spine regarding  more comprehensive evaluation of the spine.   [RR]   0654 Evidence of UTI and questionable pneumonia versus pulmonary contusion noted.  Does have concerns for possible aspiration.  Will consult trauma and will need admission.  Ordered Zosyn. [RR]   0707 Ph 7.2, likely renal [MEKHI]      ED Course User Index  [MEKHI] Fatemeh Sinclair MD  [RR] Flora Harrington MD         Diagnoses as of 05/10/25 0817   Shortness of breath   Aspiration pneumonia, unspecified aspiration pneumonia type, unspecified laterality, unspecified part of lung (Multi)   Bacterial UTI   Closed fracture of multiple ribs of right side, initial encounter       Medical Decision Making:  Patient is a 85-year-old male with past medical history of translatory's, recurrent syncope, chronic lung mass, A-fib presenting from his nursing facility due to concerns of acute renal failure based on outpatient lab work.  Broad lab work, VBG and chest x-ray was obtained to evaluate for various pathology.  Chest x-ray does show signs of acute mildly displaced anterior lateral right 8th and 9th rib fractures.  He also has increased right  middle lobe collapse that may be secondary to chronic mucous plugging.  Will obtain CT pan scan in the setting of acute renal failure and new rib fractures.  Patient has a mildly elevated white count at 13.9 without a neutrophil predominance.  Creatinine is elevated at 5.29 and when compared to prior lab work, patient's creatinine in August 2024 was 1.67.  Show was covered empirically with Zosyn given UTI and possible pneumonia both noted on urinalysis as well as chest x-ray and CT.  He is not altered at this time, troponin is normal and will need to be admitted for ongoing treatment.  Pending CT pan scan results, patient will need to be admitted.  Care was overseen by attending physician who agrees with plan disposition    External records reviewed: recent inpatient, clinic, and prior ED notes  Diagnostic imaging independently reviewed/interpreted by me (as reflected in MDM) includes: CT pan scan  Social Determinants Affecting Care: None identified  Discussion of management with other providers: attending  Prescription Drug Consideration: none  Escalation of Care: handoff    Impression:   JULIETA  8-9 acute rib fracture    Disposition: Handoff      Procedures ? SmartLinks last updated 5/8/2025 3:06 AM        Flora Harrington MD  Resident  05/10/25 [id number]

## 2025-05-08 NOTE — PROGRESS NOTES
[facility]  TRAUMA ICU - PROGRESS NOTE    Patient Name: Florentin Anderson  MRN: [id number]  Admit Date: 508  : 1940  AGE: 85 y.o.   GENDER: male  ==============================================================================   [###USE THIS SECTION FOR TRAUMA PATIENTS ONLY###]  MECHANISM OF INJURY:   85M with hx COPD and tobacco use presents from SNF with shortness of breath and nausea (currently resolved). Had multiple weeks of mechanical falls, most recent 3-4 days ago. Found to have multiple right sided rib fractures, prompting trauma consult. Does c/o trouble breathing with new oxygen requirement, associated R sided chest pain.  LOC (yes/no?): yes  Anticoagulant / Anti-platelet Rx? 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 Penetrating atheromatous ulcer     PROCEDURES:  : R RAYMOND block     ==============================================================================  TODAY'S ASSESSMENT AND PLAN OF CARE:  Florentin Anderson is a 85 y.o. male in the ICU due to: respiratory monitoring     NEURO/PAIN/SEDATION:   - Minimize sedating medications  - Delirium prevention measures, control day/night cycle as able   - Analgesia: tylenol 975mg q8hr scheduled, dilaudid 0.2 nd 0.4 prn q4hr   - Sedation: none    RESPIRATORY:   #Rib fxs, hx COPD, possible CAP   - Maintain SpO2 >97%, supplemental O2 PRN  - Continuous pulse ox  -currently on 4 L NC  - Encourage IS use every hour while awake  - Encourage cough and deep breathing  -Acute pain consult for RAYMOND block   -tid bronchial hygiene for now due to low IS   -on zosyn for CAP     CARDIOVASC:   #hx of afib (no AC)  - Continuous  cardiac monitoring  - Ongoing hemodynamic monitoring  - MAP goal > 65mmHg  - on home amiodarone 200mg daily  - on home metoprolol tartrate 50mg BID    GI:   #pancreatitis   - clear liquid diet   - BR with senna and miralax  - received 1L bolus of LR, on LR at 75 ml/hr     :   #UTI, JULIETA on CKD  -on zosyn day #1/7  -pending urine culture   - Crews/External catheter in place, maintain / Voiding spontaneously clear yellow urine   - Maintain UOP 0.5-1.0 ml/kg/hr  - Strict I&Os     FEN:   - clear liquid diet   - mIVF, LR @ 75/hr  - Monitor and replete electrolytes as clinically indicated, Mg > 2 and K > 4  - AM labs     HEMATOLOGIC:   - Monitor for s/sx of anemia  - Trend labs, transfuse as clinically indicated, maintain Hgb > 7     ENDOCRINE:   - SSI, BG goal < 180  - Q4h Accuchecks  - Hypoglycemia protocol     MUSCULOSKELETAL/SKIN:   -ICU skin protocol     INFECTIOUS DISEASE:   - Afebrile, on zosyn for UTI and developing pneumonia  - Continue to monitor WBC count and vitals   -pending urine cultures     GI PROPHYLAXIS:   -not indicated at this time     DVT PROPHYLAXIS:   -heparin 5,000 units q8hr  -SCDs    DISPOSITION: Continue TICU care     Patient was discussed with [place]    Total face to face time spent with patient/family of 30 minutes, with >50% of the time spent discussing plan of care/management, counseling/educating on disease processes, explaining results of diagnostic testing.     Eldon Tomlin PA-C  Trauma Surgery  [id number]  ==============================================================================  CHIEF COMPLAINT / OVERNIGHT EVENTS / HPI:   Patient was seen laying in bed with O2 at 4 L NC appearing comfortable. Patient states he has been SOB but denies any chest pain or abdominal pain. Patient states that he has not had BM but denies any other acute complaints at this time.     MEDICAL HISTORY / ROS:  Admission history and ROS reviewed. Pertinent changes as follows:  none    PHYSICAL EXAM:  Heart  "Rate:  [56-93]   Temp:  [35 °C (95 °F)-36.7 °C (98 °F)]   Resp:  [14-27]   BP: ()/(47-95)   Height:  [177.8 cm (5' 10\")]   Weight:  [61 kg (134 lb 7.7 oz)-65.8 kg (145 lb)]   SpO2:  [85 %-99 %]   Physical Exam  Constitutional:       General: He is not in acute distress.     Appearance: He is ill-appearing. He is not toxic-appearing.   HENT:      Head: Normocephalic.      Right Ear: External ear normal.      Left Ear: External ear normal.      Nose: Nose normal.      Mouth/Throat:      Mouth: Mucous membranes are dry.   Eyes:      Extraocular Movements: Extraocular movements intact.      Pupils: Pupils are equal, round, and reactive to light.   Cardiovascular:      Rate and Rhythm: Normal rate and regular rhythm.      Pulses: Normal pulses.   Pulmonary:      Effort: Pulmonary effort is normal. No respiratory distress.      Breath sounds: Rhonchi present. No wheezing.      Comments: Rhonci present in right lung fields   Abdominal:      General: There is no distension.      Palpations: Abdomen is soft.      Tenderness: There is no abdominal tenderness.   Musculoskeletal:         General: Normal range of motion.   Skin:     General: Skin is warm and dry.   Neurological:      Mental Status: He is alert and oriented to person, place, and time. Mental status is at baseline.      Comments: 5/5 BUE  strength and 5/5 dorsi/plantar flexion    Psychiatric:         Mood and Affect: Mood normal.         Behavior: Behavior normal.         IMAGING SUMMARY:  (summary of new imaging findings, not a copy of dictation)  No new imaging findings     LABS:  Results from last 7 days   Lab Units 05/09/25  0251 05/08/25  1956 05/08/25  0147   WBC AUTO x10*3/uL 11.5* 13.0* 13.9*   HEMOGLOBIN g/dL 12.9* 13.4* 14.7   HEMATOCRIT % 38.5* 41.4 42.0   PLATELETS AUTO x10*3/uL 178 195 196   NEUTROS PCT AUTO % 81.5 84.0 86.2   LYMPHS PCT AUTO % 4.7 4.2 3.6   MONOS PCT AUTO % 12.4 11.0 9.3   EOS PCT AUTO % 0.8 0.2 0.0         Results from " last 7 days   Lab Units 05/09/25  0251 05/08/25  1956 05/08/25  1104 05/08/25  0147   SODIUM mmol/L 138 135* 139 136   POTASSIUM mmol/L 3.5 4.0 4.1 4.1   CHLORIDE mmol/L 103 99 102 101   CO2 mmol/L 24 25 23 19*   BUN mg/dL 81* 79* 77* 73*   CREATININE mg/dL 3.97* 4.29* 5.03* 5.29*   CALCIUM mg/dL 8.5* 8.5* 8.7 8.5*   PROTEIN TOTAL g/dL  --   --   --  7.6   BILIRUBIN TOTAL mg/dL  --   --   --  1.7*   ALK PHOS U/L  --   --   --  73   ALT U/L  --   --   --  39   AST U/L  --   --   --  28   GLUCOSE mg/dL 82 76 82 125*     Results from last 7 days   Lab Units 05/08/25  0147   BILIRUBIN TOTAL mg/dL 1.7*         I have reviewed all medications, laboratory results, and imaging pertinent for today's encounter.

## 2025-05-08 NOTE — PROGRESS NOTES
Emergency Department Transition of Care Note       Signout   I received Florentin Anderson in signout from Dr. Gabino Stone.  Please see the ED Provider Note for all HPI, PE and MDM up to the time of signout at 700.  This is in addition to the primary record.    In brief Florentin Anderson is an 85 y.o. male presenting for shortness of breath nausea and vomiting.  Found to have rib fractures as well as a pneumonia and a UTI as well as a white count.    At the time of signout we were awaiting:  Trauma evaluation and final disposition    ED Course & Medical Decision Making   Medical Decision Making:  Under my care, patient was reevaluated, had increased oxygen requirement, so did get a gas.  Patient does have some CO2 retention with depressed pH, so we will provide with DuoNebs.  Given the patient's rib fractures, did consult trauma.  Patient will be admitted to the PICU service at this time.  Additionally, trauma ICU service did request pain consult, they will plan to do an intercostal nerve block given the patient's discomfort which may have precipitated a pneumonia given it is in conjunction with where his broken ribs are.    ED Course:  ED Course as of 05/08/25 1139   Thu May 08, 2025   0553 IMPRESSION:  1. Mild pancreatic enlargement and fat stranding within the anterior  pararenal space suggestive of acute pancreatitis. Correlation with  lipase would be recommended. No thick-walled fluid collections are  evident. Evaluation limited on noncontrast exam.  2. Right posterolateral 8th through 11th mildly displaced rib  fractures.  3. Scattered mucous plugging within the right middle lobe with  associated ground-glass opacities and consolidative opacities.  Findings are compatible with atelectasis with developing aspiration  pneumonitis. A component of contusion can not be excluded given  nearby rib fractures.  4. Postoperative changes of esophagectomy with gastric pull-through  with fat stranding and indistinct haziness of the  conduit suggestive  of infectious/inflammatory etiology.  5. 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  6. Please refer to same-day CT thoracic and lumbar spine regarding  more comprehensive evaluation of the spine.   [RR]   0654 Evidence of UTI and questionable pneumonia versus pulmonary contusion noted.  Does have concerns for possible aspiration.  Will consult trauma and will need admission.  Ordered Zosyn. [RR]   0707 Ph 7.2, likely renal [MEKHI]      ED Course User Index  [MEKHI] Fatemeh Sinclair MD  [RR] Flora Harrington MD         Diagnoses as of 05/08/25 1139   Shortness of breath   Aspiration pneumonia, unspecified aspiration pneumonia type, unspecified laterality, unspecified part of lung (Multi)   Bacterial UTI   Closed fracture of multiple ribs of right side, initial encounter       Disposition   As a result of their workup, the patient will require admission to the hospital.  The patient was informed of his diagnosis.  The patient was given the opportunity to ask questions and I answered them. The patient agreed to be admitted to the hospital.    Procedures   Procedures    Patient seen and discussed with ED attending physician.    Benji Faustin MD  Emergency Medicine

## 2025-05-08 NOTE — CONSULTS
Florentin Anderson is a 85 y.o. year old male patient who presents for  with No admitting provider for patient encounter. on .  Acute Pain consulted for assistance with pain control.     Anticipated Postop Pain Issues -   Palliative: typically relieved with IV analgesics and regional local anesthetics  Provocative: typically with movement  Quality: typically burning and aching  Radiation: typically none  Severity: typically severe 8-10/10  Timing: typically constant    Medical History[1]     Surgical History[2]     Family History[3]     Social History     Socioeconomic History    Marital status: Single     Spouse name: Not on file    Number of children: Not on file    Years of education: Not on file    Highest education level: Not on file   Occupational History    Not on file   Tobacco Use    Smoking status: Every Day     Current packs/day: 0.50     Average packs/day: 0.5 packs/day for 60.0 years (30.0 ttl pk-yrs)     Types: Cigarettes    Smokeless tobacco: Never   Substance and Sexual Activity    Alcohol use: Never    Drug use: Never    Sexual activity: Not on file   Other Topics Concern    Not on file   Social History Narrative    Not on file     Social Drivers of Health     Financial Resource Strain: Not on File (8/26/2019)    Received from [facility]    Financial Resource Strain     Financial Resource Strain: 0   Food Insecurity: High Risk (3/18/2025)    Received from [facility] SDOH Screening     Summarize member's perception of social determinants of health including living situation, food insecurity, financial needs, transportation, learning, and technology. identify barriers to include p...: Member states he feels safe/ secure in her environment. member lives in a ltc facility states all...     Does the member feel like he/she gets enough food most days?: Yes   Transportation Needs: High Risk (3/18/2025)    Received from [facility] SDOH Screening     The following questions pertain to  transportation, utilities, employment, and financial or legal concerns: Select next question   Physical Activity: Not on File (2019)    Received from [facility]    Physical Activity     Physical Activity: 0   Stress: Not on File (2019)    Received from [facility]    Stress     Stress: 0   Social Connections: Not on File (2019)    Received from [facility]    Social Connections     Social Connections and Isolation: 0   Intimate Partner Violence: Not on file   Housing Stability: Not on File (2019)    Received from [employer] [facility]    Housing Stability     Housin        RX Allergies[4]      Review of Systems  Gen: No fatigue, anorexia, insomnia, fever.   Eyes: No vision loss, double vision, drainage, eye pain.   ENT: No pharyngitis, dry mouth, no hearing changes or ear discharge  Cardiac: No chest pain, palpitations, syncope, near syncope.   Pulmonary: NC 4 L  Heme/lymph: No swollen glands, fever, bleeding.   GI: No abdominal pain, change in bowel habits, melena, hematemesis, hematochezia, nausea, vomiting, diarrhea.   : No discharge, dysuria, frequency, urgency, hematuria.  Endo: No polyuria or weight loss.   Musculoskeletal: Negative for any pain or loss of ROM/weakness  Skin: No rashes or lesions  Neuro: Normal speech, no numbness or weakness. No gait difficulties  Review of systems is otherwise negative unless stated above or in history of present illness.    Physical Exam:  Constitutional:  no distress, alert and cooperative  Eyes: clear sclera  Head/Neck: No apparent injury, trachea midline  Respiratory/Thorax: Patent airways, thorax symmetric, breathing comfortably  Cardiovascular: no pitting edema  Gastrointestinal: Nondistended  Musculoskeletal: ROM intact  Extremities: no clubbing  Neurological: alert, gong x4  Psychological: Appropriate affect    Results for orders placed or performed during the hospital encounter of 25 (from the past 24 hours)   ECG 12 lead   Result Value Ref Range    Ventricular Rate  71 BPM    Atrial Rate 71 BPM    MT Interval 264 ms    QRS Duration 90 ms    QT Interval 418 ms    QTC Calculation(Bazett) 454 ms    P Axis 92 degrees    R Axis -66 degrees    T Axis 20 degrees    QRS Count 11 beats    Q Onset 225 ms    P Onset 93 ms    P Offset 148 ms    T Offset 434 ms    QTC Fredericia 442 ms   CBC and Auto Differential   Result Value Ref Range    WBC 13.9 (H) 4.4 - 11.3 x10*3/uL    nRBC 0.0 0.0 - 0.0 /100 WBCs    RBC 4.65 4.50 - 5.90 x10*6/uL    Hemoglobin 14.7 13.5 - 17.5 g/dL    Hematocrit 42.0 41.0 - 52.0 %    MCV 90 80 - 100 fL    MCH 31.6 26.0 - 34.0 pg    MCHC 35.0 32.0 - 36.0 g/dL    RDW 14.3 11.5 - 14.5 %    Platelets 196 150 - 450 x10*3/uL    Neutrophils % 86.2 40.0 - 80.0 %    Immature Granulocytes %, Automated 0.5 0.0 - 0.9 %    Lymphocytes % 3.6 13.0 - 44.0 %    Monocytes % 9.3 2.0 - 10.0 %    Eosinophils % 0.0 0.0 - 6.0 %    Basophils % 0.4 0.0 - 2.0 %    Neutrophils Absolute 11.95 (H) 1.60 - 5.50 x10*3/uL    Immature Granulocytes Absolute, Automated 0.07 0.00 - 0.50 x10*3/uL    Lymphocytes Absolute 0.50 (L) 0.80 - 3.00 x10*3/uL    Monocytes Absolute 1.29 (H) 0.05 - 0.80 x10*3/uL    Eosinophils Absolute 0.00 0.00 - 0.40 x10*3/uL    Basophils Absolute 0.05 0.00 - 0.10 x10*3/uL   Magnesium   Result Value Ref Range    Magnesium 2.17 1.60 - 2.40 mg/dL   Comprehensive metabolic panel   Result Value Ref Range    Glucose 125 (H) 74 - 99 mg/dL    Sodium 136 136 - 145 mmol/L    Potassium 4.1 3.5 - 5.3 mmol/L    Chloride 101 98 - 107 mmol/L    Bicarbonate 19 (L) 21 - 32 mmol/L    Anion Gap 20 10 - 20 mmol/L    Urea Nitrogen 73 (H) 6 - 23 mg/dL    Creatinine 5.29 (H) 0.50 - 1.30 mg/dL    eGFR 10 (L) >60 mL/min/1.73m*2    Calcium 8.5 (L) 8.6 - 10.6 mg/dL    Albumin 3.6 3.4 - 5.0 g/dL    Alkaline Phosphatase 73 33 - 136 U/L    Total Protein 7.6 6.4 - 8.2 g/dL    AST 28 9 - 39 U/L    Bilirubin, Total 1.7 (H) 0.0 - 1.2 mg/dL    ALT 39 10 - 52 U/L   Troponin I, High Sensitivity   Result Value Ref  Range    Troponin I, High Sensitivity (CMC) 34 0 - 53 ng/L   Lipase   Result Value Ref Range    Lipase 3,124 (H) 9 - 82 U/L   Amylase   Result Value Ref Range    Amylase 982 (H) 29 - 103 U/L   Urinalysis with Reflex Culture and Microscopic   Result Value Ref Range    Color, Urine Yellow Light-Yellow, Yellow, Dark-Yellow    Appearance, Urine Clear Clear    Specific Gravity, Urine 1.017 1.005 - 1.035    pH, Urine 6.0 5.0, 5.5, 6.0, 6.5, 7.0, 7.5, 8.0    Protein, Urine 50 (1+) (A) NEGATIVE, 10 (TRACE), 20 (TRACE) mg/dL    Glucose, Urine Normal Normal mg/dL    Blood, Urine 1.0 (3+) (A) NEGATIVE mg/dL    Ketones, Urine NEGATIVE NEGATIVE mg/dL    Bilirubin, Urine NEGATIVE NEGATIVE mg/dL    Urobilinogen, Urine 2 (1+) (A) Normal mg/dL    Nitrite, Urine NEGATIVE NEGATIVE    Leukocyte Esterase, Urine 250 Lisa/uL (A) NEGATIVE   Microscopic Only, Urine   Result Value Ref Range    WBC, Urine 21-50 (A) 1-5, NONE /HPF    RBC, Urine >20 (A) NONE, 1-2, 3-5 /HPF    Squamous Epithelial Cells, Urine 1-9 (SPARSE) Reference range not established. /HPF    Mucus, Urine FEW Reference range not established. /LPF    Hyaline Casts, Urine 2+ (A) NONE /LPF    Calcium Oxalate Crystals, Urine 1+ NONE, 1+ /HPF   Blood Culture    Specimen: Peripheral Venipuncture; Blood culture   Result Value Ref Range    Blood Culture Loaded on Instrument - Culture in progress    Blood Culture    Specimen: Peripheral Venipuncture; Blood culture   Result Value Ref Range    Blood Culture Loaded on Instrument - Culture in progress    Blood Gas Venous Full Panel   Result Value Ref Range    POCT pH, Venous 7.23 (LL) 7.33 - 7.43 pH    POCT pCO2, Venous 55 (H) 41 - 51 mm Hg    POCT pO2, Venous 21 (L) 35 - 45 mm Hg    POCT SO2, Venous 28 (L) 45 - 75 %    POCT Oxy Hemoglobin, Venous 27.8 (L) 45.0 - 75.0 %    POCT Hematocrit Calculated, Venous 47.0 41.0 - 52.0 %    POCT Sodium, Venous 134 (L) 136 - 145 mmol/L    POCT Potassium, Venous 4.1 3.5 - 5.3 mmol/L    POCT Chloride,  Venous 103 98 - 107 mmol/L    POCT Ionized Calicum, Venous 1.14 1.10 - 1.33 mmol/L    POCT Glucose, Venous 95 74 - 99 mg/dL    POCT Lactate, Venous 1.1 0.4 - 2.0 mmol/L    POCT Base Excess, Venous -5.4 (L) -2.0 - 3.0 mmol/L    POCT HCO3 Calculated, Venous 23.0 22.0 - 26.0 mmol/L    POCT Hemoglobin, Venous 15.7 13.5 - 17.5 g/dL    POCT Anion Gap, Venous 12.0 10.0 - 25.0 mmol/L    Patient Temperature 37.0 degrees Celsius    FiO2 36 %        Florentin Anderson is a 85 y.o. year old male patient with past medical history of COPD, chronic lung mass, A-fib presenting from his nursing facility due to concerns of acute renal failure based on outpatient lab work. Presenting to ED with Hypoxic respiratory failure and acute renal injury and Right Sided posterolateral rib fractures 4-11 seen on CT. Admitted to trauma service. Acute Pain consulted for assistance with pain control.     Plan:    - Right erector spinae block with catheter performed on 5/8/2025  - Ambit ball with Ropivacaine 0.2%/NaCl 0.9% 500mL, Rate 10 cc/hr unilaterally  - Ambit medication will not interfere with pain medication prescribed by the primary team.   - Please be aware of local anesthetic toxic dose and absorption variability before considering lidocaine patches  - Acute pain service will follow while catheters in place  - Rest of pain management per primary team    Acute Pain Resident  pg [phone] ph [phone]        [1]   Past Medical History:  Diagnosis Date    Closed fracture of distal clavicle 10/25/2023    Giant cell arteritis (Multi) 10/25/2023    Macula scars of posterior pole (postinflammatory) (post-traumatic), right eye 07/10/2019    Macular scar of right eye    Mass of right lung 10/25/2023    Paroxysmal atrial fibrillation (Multi)     Rib fracture 10/25/2023    UTI (urinary tract infection)    [2]   Past Surgical History:  Procedure Laterality Date    CHEST SURGERY      patient does not recall the details   [3]   Family History  Adopted: Yes   Problem  Relation Name Age of Onset    Heart attack Mother      Heart attack Father     [4] No Known Allergies

## 2025-05-08 NOTE — PROCEDURES
Peripheral Block    Patient location during procedure: pre-op  Medication administered at: 5/8/2025 11:57 AM  End time: 5/8/2025 12:16 PM  Reason for block: at surgeon's request and post-op pain management  Staffing  Performed: resident and attending   Authorized by: Yovana Power MD    Performed by: Lama Mihaela MD  Preanesthetic Checklist  Completed: patient identified, IV checked, site marked, risks and benefits discussed, surgical consent, monitors and equipment checked, pre-op evaluation and timeout performed   Timeout performed at: 5/8/2025 11:57 AM  Peripheral Block  Patient position: sitting  Prep: ChloraPrep  Patient monitoring: heart rate and continuous pulse ox  Block type: RAYMOND  Injection technique: catheter  Guidance: ultrasound guided  Local infiltration: lidocaine  Infiltration strength: 1 %  Dose: 3 mL  Needle  Needle type: Tuohy   Needle gauge: 22 G  Needle length: 8 cm  Needle localization: ultrasound guidance     image stored in chart  Assessment  Injection assessment: negative aspiration for heme, no paresthesia on injection, incremental injection and local visualized surrounding nerve on ultrasound  Additional Notes  Erector spinae plane block:     Prior to procedure: Following a focused history, procedure-related and patient-specific complications were discussed. Risks, benefits, and alternatives were explained. Informed, written consent was provided by the patient and/or surrogate decision maker for the block. Anticoagulation (if any) was held per JERSNO guidelines. ASA monitors were applied. Patient was positioned, prepped with chlorhexidine, and draped with sterile towels.     Ultrasound guidance was used to visualize the erector spinae muscle above the TP/costal junction at T9. Skin was numbed with 1% lidocaine. Needle was inserted and advanced towards target with visualization of the needle throughout duration of the procedure. A total of 20 cc of 0.5% ropivacaine, was divided and  injected unilaterally. Catheters threaded and secured. Patient tolerated procedure well.    Timeout by Michele CARCAMO

## 2025-05-09 NOTE — PROGRESS NOTES
[facility]  TRAUMA ICU - PROGRESS NOTE    Patient Name: Florentin Anderson  MRN: [id number]  Admit Date: 508  : 1940  AGE: 85 y.o.   GENDER: male  ==============================================================================   [###USE THIS SECTION FOR TRAUMA PATIENTS ONLY###]  MECHANISM OF INJURY:   85M with hx COPD and tobacco use presents from SNF with shortness of breath and nausea (currently resolved). Had multiple weeks of mechanical falls, most recent 3-4 days ago. Found to have multiple right sided rib fractures, prompting trauma consult. Does c/o trouble breathing with new oxygen requirement, associated R sided chest pain.   LOC (yes/no?): yes  Anticoagulant / Anti-platelet Rx? 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 Penetrating atheromatous ulcer     PROCEDURES:  : R RAYMOND block     ==============================================================================  TODAY'S ASSESSMENT AND PLAN OF CARE:  Florentin Anderson is a 85 y.o. male in the ICU due to:  respiratory monitoring     NEURO/PAIN/SEDATION:   - Minimize sedating medications  - Delirium prevention measures, control day/night cycle as able   - RAYMOND block   - Analgesia: tylenol 975mg q8hr scheduled, oxy 5 and 10 q4 prn  - Sedation: none     RESPIRATORY:   #Rib fxs, hx COPD, possible CAP   - Maintain SpO2 >87%, supplemental O2 PRN  - Continuous pulse ox  -currently on 6 L NC  - Encourage IS use every hour while awake  - Encourage cough and deep breathing  -tid bronchial hygiene for now due to low IS   -on zosyn for CAP      CARDIOVASC:   #hx of afib (no AC)  - Continuous cardiac monitoring  -  "Ongoing hemodynamic monitoring  - MAP goal > 65mmHg  - on home amiodarone 200mg daily  - on home metoprolol tartrate 50mg BID     GI:   #pancreatitis   - Regular diet, tolerating  - BR with senna and miralax  - received 1L bolus of LR     :   #UTI, JULIETA on CKD  -on zosyn day #1/7  - UA likely contaminate  - Crews/External catheter in place, maintain / Voiding spontaneously clear yellow urine   - Maintain UOP 0.5-1.0 ml/kg/hr  - Strict I&Os      FEN:   - regular diet  - mIVF, LR @ 75/hr  - Monitor and replete electrolytes as clinically indicated, Mg > 2 and K > 4  - AM labs      HEMATOLOGIC:   - Monitor for s/sx of anemia  - Trend labs, transfuse as clinically indicated, maintain Hgb > 7      ENDOCRINE:   - SSI, BG goal < 180  - Q4h Accuchecks  - Hypoglycemia protocol      MUSCULOSKELETAL/SKIN:   -ICU skin protocol      INFECTIOUS DISEASE:   - Afebrile, on zosyn for UTI and developing pneumonia  - Continue to monitor WBC count and vitals   -pending urine cultures  - Blood culture positive for Gram+ cocci    --> Vancomycin added, pending sensitivity    --> MRSA swab pending     GI PROPHYLAXIS:   -not indicated at this time      DVT PROPHYLAXIS:   -heparin 5,000 units q8hr  -SCDs     DISPOSITION: Continue TICU care      Patient was discussed with Dr. Domínguez   ==============================================================================  CHIEF COMPLAINT / OVERNIGHT EVENTS / HPI:   Patient was seen laying in bed with O2 at 6 L NC appearing comfortable. Patient states he has been SOB but denies any chest pain or abdominal pain.    MEDICAL HISTORY / ROS:  Admission history and ROS reviewed. Pertinent changes as follows:  As above    PHYSICAL EXAM:  Heart Rate:  [56-93]   Temp:  [35 °C (95 °F)-36.6 °C (97.9 °F)]   Resp:  [14-27]   BP: ()/(47-95)   Height:  [177.8 cm (5' 10\")]   Weight:  [61 kg (134 lb 7.7 oz)-65.8 kg (145 lb)]   SpO2:  [85 %-99 %]   Physical Exam  Constitutional:       General: He is not in acute " distress.     Appearance: He is frail appearing. He is not toxic-appearing.   HENT:      Head: Normocephalic.      Right Ear: External ear normal.      Left Ear: External ear normal.      Nose: Nose normal.      Mouth/Throat:      Mouth: Mucous membranes are dry.   Eyes:      Extraocular Movements: Extraocular movements intact.      Pupils: Pupils are equal, round, and reactive to light.   Cardiovascular:      Rate and Rhythm: Normal rate and regular rhythm.      Pulses: Normal pulses.   Pulmonary:      Effort: Pulmonary effort is normal. No respiratory distress.      Breath sounds: Rhonchi present. No wheezing.      Comments: Rhonci present in right lung fields, less in left lung fields  Abdominal:      General: There is no distension.      Palpations: Abdomen is soft.      Tenderness: There is no abdominal tenderness.   Musculoskeletal:         General: Normal range of motion.   Skin:     General: Skin is warm and dry.   Neurological:      Mental Status: He is alert and oriented to person, place, and time. Mental status is at baseline.      Comments: 5/5 BUE  strength and 5/5 dorsi/plantar flexion    Psychiatric:         Mood and Affect: Mood normal.         Behavior: Behavior normal    IMAGING SUMMARY:  (summary of new imaging findings, not a copy of dictation)  CXR 5/9: Bibasilar opacities    LABS:  Results from last 7 days   Lab Units 05/09/25 0251 05/08/25 1956 05/08/25  0147   WBC AUTO x10*3/uL 11.5* 13.0* 13.9*   HEMOGLOBIN g/dL 12.9* 13.4* 14.7   HEMATOCRIT % 38.5* 41.4 42.0   PLATELETS AUTO x10*3/uL 178 195 196   NEUTROS PCT AUTO % 81.5 84.0 86.2   LYMPHS PCT AUTO % 4.7 4.2 3.6   MONOS PCT AUTO % 12.4 11.0 9.3   EOS PCT AUTO % 0.8 0.2 0.0         Results from last 7 days   Lab Units 05/09/25 0251 05/08/25 1956 05/08/25  1104 05/08/25  0147   SODIUM mmol/L 138 135* 139 136   POTASSIUM mmol/L 3.5 4.0 4.1 4.1   CHLORIDE mmol/L 103 99 102 101   CO2 mmol/L 24 25 23 19*   BUN mg/dL 81* 79* 77* 73*    CREATININE mg/dL 3.97* 4.29* 5.03* 5.29*   CALCIUM mg/dL 8.5* 8.5* 8.7 8.5*   PROTEIN TOTAL g/dL  --   --   --  7.6   BILIRUBIN TOTAL mg/dL  --   --   --  1.7*   ALK PHOS U/L  --   --   --  73   ALT U/L  --   --   --  39   AST U/L  --   --   --  28   GLUCOSE mg/dL 82 76 82 125*     Results from last 7 days   Lab Units 05/08/25  0147   BILIRUBIN TOTAL mg/dL 1.7*         I have reviewed all medications, laboratory results, and imaging pertinent for today's encounter.     seen discussed with team  and examined on rounds this AM . Working IS and acapella well pain well controlled     This critically ill patient  continues  to be at-risk for clinically significant deterioration / failure due to the above mentioned dysfunctional, unstable organ systems.  I have personally identified and managed all complex critical care issues to prevent aforementioned clinical deterioration.  Critical care time is spent at bedside and/or the immediate area and has included, but is not limited to, the review of diagnostic tests, labs, radiographs, serial assessments of hemodynamics, respiratory status, ventilatory management, and family updates.  Time spent in procedures and teaching are reported separately.     CRITICAL CARE TIME: 35 minutes    Jerry Domínguez MD

## 2025-05-09 NOTE — CARE PLAN
Problem: Pain - Adult  Goal: Verbalizes/displays adequate comfort level or baseline comfort level  Outcome: Progressing     Problem: Safety - Adult  Goal: Free from fall injury  Outcome: Progressing     Problem: Discharge Planning  Goal: Discharge to home or other facility with appropriate resources  Outcome: Progressing     Problem: Chronic Conditions and Co-morbidities  Goal: Patient's chronic conditions and co-morbidity symptoms are monitored and maintained or improved  Outcome: Progressing     Problem: Nutrition  Goal: Nutrient intake appropriate for maintaining nutritional needs  Outcome: Progressing     Problem: Respiratory  Goal: Clear secretions with interventions this shift  Outcome: Progressing  Goal: Minimize anxiety/maximize coping throughout shift  Outcome: Progressing  Goal: Minimal/no exertional discomfort or dyspnea this shift  Outcome: Progressing  Goal: No signs of respiratory distress (eg. Use of accessory muscles. Peds grunting)  Outcome: Progressing  Goal: Patent airway maintained this shift  Outcome: Progressing  Goal: Tolerate pulmonary toileting this shift  Outcome: Progressing  Goal: Verbalize decreased shortness of breath this shift  Outcome: Progressing  Goal: Wean oxygen to maintain O2 saturation per order/standard this shift  Outcome: Progressing  Goal: Increase self care and/or family involvement in next 24 hours  Outcome: Progressing     Problem: Skin  Goal: Decreased wound size/increased tissue granulation at next dressing change  Outcome: Progressing  Goal: Participates in plan/prevention/treatment measures  Outcome: Progressing  Goal: Prevent/manage excess moisture  Outcome: Progressing  Goal: Prevent/minimize sheer/friction injuries  Outcome: Progressing  Goal: Promote/optimize nutrition  Outcome: Progressing  Goal: Promote skin healing  Outcome: Progressing     Problem: Fall/Injury  Goal: Not fall by end of shift  Outcome: Progressing  Goal: Be free from injury by end of the  shift  Outcome: Progressing  Goal: Verbalize understanding of personal risk factors for fall in the hospital  Outcome: Progressing  Goal: Verbalize understanding of risk factor reduction measures to prevent injury from fall in the home  Outcome: Progressing  Goal: Use assistive devices by end of the shift  Outcome: Progressing  Goal: Pace activities to prevent fatigue by end of the shift  Outcome: Progressing

## 2025-05-09 NOTE — PROGRESS NOTES
Physical Therapy    Physical Therapy Evaluation    Patient Name: Florentin Anderson  MRN: [id number]  Department: [facility] TSICU  Room: 20/20A  Today's Date: 5/9/2025   Time Calculation  Start Time: 1435  Stop Time: 1501  Time Calculation (min): 26 min    Assessment/Plan   PT Assessment  PT Assessment Results: Decreased endurance, Impaired balance, Decreased mobility, Decreased cognition  Rehab Prognosis: Good  Barriers to Discharge Home: Physical needs  Physical Needs: Returning to long-term care/other facility  Evaluation/Treatment Tolerance: Patient tolerated treatment well  Medical Staff Made Aware: Yes  End of Session Communication: Bedside nurse  Assessment Comment: Pt is an 84 yo male admitted s/p recurrent falls at SNF leading to rib fxs. Skilled PT indicated to progress safety and independence with mobility.  End of Session Patient Position: Bed, 3 rail up, Alarm on  IP OR SWING BED PT PLAN  Inpatient or Swing Bed: Inpatient  PT Plan  Treatment/Interventions: Bed mobility, Transfer training, Gait training, Balance training, Neuromuscular re-education, Endurance training, Therapeutic exercise, Therapeutic activity, Home exercise program, Postural re-education  PT Plan: Ongoing PT  PT Frequency: 3 times per week  PT Discharge Recommendations: Moderate intensity level of continued care  Equipment Recommended upon Discharge:  (pt owns wheelchair)  PT Recommended Transfer Status: Assist x2  PT - OK to Discharge: Yes    Subjective   General Visit Information:  General  Reason for Referral: Presents from SNF with SOB and nausea, multiple weeks of mechanical falls. Found to have right sided posterolateral mildly displaced 8-11 rib fxs s/p RAYMOND block  Past Medical History Relevant to Rehab: hx COPD and tobacco use, lung CA, esophagectomy, afib  Family/Caregiver Present: No  Co-Treatment: OT  Co-Treatment Reason: multiple falls from SNF, anticipated AMPAC<10  Prior to Session Communication: Bedside nurse  Patient Position  Received: Bed, 3 rail up, Alarm on  General Comment: Pt supine in bed on arrival, agreeable. On 6 L HFNC, nerve block.  Home Living:  Home Living  Type of Home: Skilled Nursing facility  Home Adaptive Equipment: Wheelchair-manual  Home Layout:  (no concerns)  Home Access: No concerns  Prior Level of Function:  Prior Function Per Pt/Caregiver Report  Receives Help From:  (staff at facility)  ADL Assistance:  (assist with bathing, otherwise independent)  Homemaking Assistance: Needs assistance  Ambulatory Assistance:  (Reports being mobile via manual wheelchair that he propels with his feet. gets in/out of bed and on/off toilet independently)  Hand Dominance: Right  Prior Function Comments: hx falls, reports being on home O2 but unsure of amount  Precautions:  Precautions  Medical Precautions: Fall precautions  Precautions Comment: SpO2 >87%      Date/Time Vitals Session Patient Position Pulse Resp SpO2 BP MAP (mmHg)    05/09/25 1435 Pre PT  --  59  20  --  103/51  68     05/09/25 1500 --  --  61  19  87 %  102/56  70     05/09/25 1501 Post PT  --  60  18  90 %  102/56  70     05/09/25 1600 --  --  63  16  95 %  105/52  69           Vital Signs Comment: lowest SpO2 noted >85%, improved with guided PLB     Objective   Pain:  Pain Assessment  Pain Assessment: 0-10  0-10 (Numeric) Pain Score: 0 - No pain  Cognition:  Cognition  Overall Cognitive Status: Impaired  Orientation Level: Disoriented to situation  Following Commands: Follows one step commands without difficulty    General Assessments:  Activity Tolerance  Endurance: Tolerates 10 - 20 min exercise with multiple rests  Early Mobility/Exercise Safety Screen: Proceed with mobilization - No exclusion criteria met    Sensation  Light Touch: No apparent deficits    Coordination  Movements are Fluid and Coordinated: No  Finger to Nose: Impaired    Postural Control  Postural Control: Impaired  Head Control: WFL    Static Sitting Balance  Static Sitting-Balance Support:  Feet supported  Static Sitting-Level of Assistance: Minimum assistance  Static Sitting-Comment/Number of Minutes: x10 mins, significant R trunk lean  Dynamic Sitting Balance  Dynamic Sitting-Balance Support: Feet supported  Dynamic Sitting-Level of Assistance: Minimum assistance, Moderate assistance       Functional Assessments:  ADL  Toileting Assistance with Device: Moderate    Bed Mobility  Bed Mobility: Yes  Bed Mobility 1  Bed Mobility 1: Supine to sitting, Sitting to supine  Level of Assistance 1: Maximum assistance, +2  Bed Mobility Comments 1: HOB elevated, draw sheet; cues for initiation  Bed Mobility 2  Bed Mobility  2: Rolling right  Level of Assistance 2: Minimum assistance  Bed Mobility Comments 2: cues for sequencing    Transfers  Transfer: Yes  Transfer 1  Transfer From 1: Sit to  Transfer to 1: Stand  Transfer Level of Assistance 1: Minimum assistance, +2  Trials/Comments 1: bilat arm in arm assist    Extremity/Trunk Assessments:    RUE   RUE :  (shoulder AROM ~90 degrees, otherwise WFL)  LUE   LUE:  (shoulder AROM ~90 degrees, otherwise WFL)  RLE   RLE :  (ROM WFL, Strength grossly 4+/5 with increased cues for correct muscle group)  LLE   LLE :  (ROM WFL, Strength grossly 4+/5 with increased cues for correct muscle group)  Outcome Measures:  [facility] Basic Mobility  Turning from your back to your side while in a flat bed without using bedrails: A lot  Moving from lying on your back to sitting on the side of a flat bed without using bedrails: A lot  Moving to and from bed to chair (including a wheelchair): A lot  Standing up from a chair using your arms (e.g. wheelchair or bedside chair): A lot  To walk in hospital room: Total  Climbing 3-5 steps with railing: Total  Basic Mobility - Total Score: 10    FSS-ICU  Ambulation: Walks <50 feet with any assistance x1 or walks any distance with assistance x2 people  Rolling: Moderate assistance (performs 50 - 74% of task)  Sitting: Moderate assistance (performs  50 - 74% of task)  Transfer Sit-to-Stand: Moderate assistance (performs 50 - 74% of task)  Transfer Supine-to-Sit: Maximal assistance (performs 25% - 49% of task)  Total Score: 12    Early Mobility/Exercise Safety Screen: Proceed with mobilization - No exclusion criteria met  ICU Mobility Scale: Standing [4]    Encounter Problems       Encounter Problems (Active)       Balance       STG - Maintains dynamic standing balance with upper extremity support with LRAD with Sup       Start:  05/09/25    Expected End:  05/30/25               Mobility       STG - Patient will ambulate x20 ft with LRAD with Sup to assist with ambulation to bathroom        Start:  05/09/25    Expected End:  05/30/25               PT Transfers       STG - Patient will perform bed mobility with Sup       Start:  05/09/25    Expected End:  05/30/25            STG - Patient will transfer sit to and from stand with Sup using LRAD       Start:  05/09/25    Expected End:  05/30/25               Pain - Adult              Education Documentation  Body Mechanics, taught by Sanjana Quinteros PT at 5/9/2025  4:15 PM.  Learner: Patient  Readiness: Acceptance  Method: Explanation  Response: Verbalizes Understanding  Comment: PT POC, suctioning for pulm hygiene    Mobility Training, taught by Sanjana Quinteros PT at 5/9/2025  4:15 PM.  Learner: Patient  Readiness: Acceptance  Method: Explanation  Response: Verbalizes Understanding  Comment: PT POC, suctioning for pulm hygiene    Education Comments  No comments found.    Sanjana Quinteros PT, DPT

## 2025-05-09 NOTE — PROGRESS NOTES
"[facility]  TRAUMA SERVICE - PROGRESS NOTE    Patient Name: Florentin Anderson  MRN: [id number]  Admit Date: 508  : 1940  AGE: 85 y.o.   GENDER: male  ==============================================================================  MECHANISM OF INJURY / CHIEF COMPLAINT:   85M with hx COPD and tobacco use presents from SNF with shortness of breath and nausea (currently resolved). Had multiple weeks of mechanical falls, most recent 3-4 days ago. Found to have multiple right sided rib fractures, prompting trauma consult. Does c/o trouble breathing with new oxygen requirement, associated R sided chest pain. +brief LOC, no blood thinners.      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 Penetrating atheromatous ulcer    PROCEDURES:   RAYMOND block     ==============================================================================  TODAY'S ASSESSMENT AND PLAN OF CARE:  MRSA swab to decide if vanco needs to be added to antibiotic regimen for R PNA   Wean O2 as tolerated and use IS     ==============================================================================  CHIEF COMPLAINT / OVERNIGHT EVENTS:   On 2L NC this AM. WBC downtrending, Cr downtrending.     MEDICAL HISTORY / ROS:  Admission history and ROS reviewed. Pertinent changes as follows:      PHYSICAL EXAM:  Heart Rate:  [53-80]   Temp:  [35 °C (95 °F)-36.6 °C (97.9 °F)]   Resp:  [14-27]   BP: ()/(47-95)   Height:  [177.8 cm (5' 10\")]   Weight:  [61 kg (134 lb 7.7 oz)]   SpO2:  [85 %-99 %]   Physical Exam  Constitutional: no acute distress, frail   Neuro: A/O x4, no gross deficits   Psych: normal affect  HEENT: No deformities, no scleral icterus   Cardiac: RRR  Chest: pain on palpation of right lateral " chest   Pulmonary: unlabored respirations on 2L NC   Abdomen: soft, non distended, non tender  Skin: warm and dry overall    Extremities: no swelling noted  MSK: moving all four    IMAGING SUMMARY:  (summary of new imaging findings, not a copy of dictation)      LABS:  Results from last 7 days   Lab Units 05/09/25 0251 05/08/25 1956 05/08/25  0147   WBC AUTO x10*3/uL 11.5* 13.0* 13.9*   HEMOGLOBIN g/dL 12.9* 13.4* 14.7   HEMATOCRIT % 38.5* 41.4 42.0   PLATELETS AUTO x10*3/uL 178 195 196   NEUTROS PCT AUTO % 81.5 84.0 86.2   LYMPHS PCT AUTO % 4.7 4.2 3.6   MONOS PCT AUTO % 12.4 11.0 9.3   EOS PCT AUTO % 0.8 0.2 0.0         Results from last 7 days   Lab Units 05/09/25 0251 05/08/25 1956 05/08/25  1104 05/08/25  0147   SODIUM mmol/L 138 135* 139 136   POTASSIUM mmol/L 3.5 4.0 4.1 4.1   CHLORIDE mmol/L 103 99 102 101   CO2 mmol/L 24 25 23 19*   BUN mg/dL 81* 79* 77* 73*   CREATININE mg/dL 3.97* 4.29* 5.03* 5.29*   CALCIUM mg/dL 8.5* 8.5* 8.7 8.5*   PROTEIN TOTAL g/dL  --   --   --  7.6   BILIRUBIN TOTAL mg/dL  --   --   --  1.7*   ALK PHOS U/L  --   --   --  73   ALT U/L  --   --   --  39   AST U/L  --   --   --  28   GLUCOSE mg/dL 82 76 82 125*     Results from last 7 days   Lab Units 05/08/25  0147   BILIRUBIN TOTAL mg/dL 1.7*           I have reviewed all medications, laboratory results, and imaging pertinent for today's encounter.

## 2025-05-09 NOTE — PROGRESS NOTES
Occupational Therapy    Evaluation    Patient Name: Florentin Anderson  MRN: [id number]  Today's Date: 5/9/2025  Room: 20/20  Time Calculation  Start Time: 1435  Stop Time: 1501  Time Calculation (min): 26 min    Assessment  IP OT Assessment  OT Assessment: Pt is an 85 year old male who demonstrates decreased strength, balance, activity tolerance, coordination, and cognition, which impedes occupational performance.  Prognosis: Good  Barriers to Discharge Home: Physical needs, Cognition needs  Cognition Needs: Cognition-related high falls risk  Physical Needs: Intermittent mobility assistance needed, Intermittent ADL assistance needed, High falls risk due to function or environment  Evaluation/Treatment Tolerance: Patient tolerated treatment well  Medical Staff Made Aware: Yes  End of Session Communication: Bedside nurse  End of Session Patient Position: Bed, 3 rail up, Alarm on    Plan:  Inpatient Plan  Treatment Interventions: ADL retraining, Functional transfer training, UE strengthening/ROM, Endurance training, Cognitive reorientation, Equipment evaluation/education, Patient/family training, Neuromuscular reeducation, Compensatory technique education  OT Frequency: 3 times per week  OT Discharge Recommendations: Moderate intensity level of continued care  Equipment Recommended upon Discharge:  (pt owns wheelchair)  OT Recommended Transfer Status: Assist of 2  OT - OK to Discharge: Yes  OT Assessment  OT Assessment Results: Decreased ADL status, Decreased safe judgment during ADL, Decreased cognition, Decreased upper extremity strength, Decreased endurance, Decreased functional mobility, Decreased IADLs  Prognosis: Good  Evaluation/Treatment Tolerance: Patient tolerated treatment well  Medical Staff Made Aware: Yes    Subjective   Current Problem:  1. Shortness of breath        2. Aspiration pneumonia, unspecified aspiration pneumonia type, unspecified laterality, unspecified part of lung (Multi)        3. Bacterial UTI         4. Closed fracture of multiple ribs of right side, initial encounter        5. Bacteremia of undetermined etiology  Transthoracic Echo Complete    Transthoracic Echo Complete        General:  Reason for Referral: Presents from SNF with SOB and nausea, multiple weeks of mechanical falls. Found to have right sided posterolateral mildly displaced 8-11 rib fxs s/p RAYMOND block  Past Medical History Relevant to Rehab: hx COPD and tobacco use, lung CA, esophagectomy, afib  Co-Treatment: PT  Co-Treatment Reason: multiple falls from SNF, anticipated AMPAC<10  Prior to Session Communication: Bedside nurse  Patient Position Received: Bed, 3 rail up, Alarm on  Family/Caregiver Present: No  General Comment: Pt supine in bed on arrival, agreeable. On 6 L HFNC, nerve block.     Precautions:  Medical Precautions: Fall precautions  Precautions Comment: SpO2 >87%    Vital Signs:   05/09/25 1435 05/09/25 1501   Vital Signs   Vitals Session Pre OT Post OT   Heart Rate 59 60   Resp 20 18   SpO2  --  90 %   /51  (EOB: 93/49) 102/56   MAP (mmHg) 68 70   Vital Signs Comment lowest SpO2 noted >85%, improved with guided PLB  --        Pain:  Pain Assessment  Pain Assessment: 0-10  0-10 (Numeric) Pain Score: 0 - No pain    Lines/Tubes/Drains:  External Urinary Catheter Male (Active)   Number of days: 0         Objective   Cognition:  Overall Cognitive Status: Impaired  Orientation Level: Disoriented to situation  Following Commands: Follows one step commands without difficulty  Cognition Comments: Mild, pleasant confusion appreciated  Insight: Mild  Processing Speed: Delayed     Confusion Assessment Method (CAM)  Acute Onset and Fluctuating Course (1A): Yes  Acute Onset and Fluctuating Course (1B): Yes  Inattention (2): Yes  Disorganized Thinking (3): No  Rate Patient's Level of Consciousness (4): Alert (Normal), No  Delirium Present: No     Home Living:  Type of Home: Skilled Nursing facility  Home Adaptive Equipment:  Wheelchair-manual  Home Layout:  (no concerns)  Home Access: No concerns     Prior Function:  Receives Help From:  (staff at facility)  ADL Assistance:  (assist with bathing, otherwise independent)  Homemaking Assistance: Needs assistance  Ambulatory Assistance:  (Reports being mobile via manual wheelchair that he propels with his feet. gets in/out of bed and on/off toilet independently)  Hand Dominance: Right  Prior Function Comments: hx falls, reports being on home O2 but unsure of amount    ADL:  Eating Assistance: Stand by  Eating Deficit: Setup, Increased time to complete  Grooming Assistance: Stand by  Grooming Deficit: Setup, Increased time to complete  Bathing Assistance: Moderate  UE Dressing Assistance: Minimal  LE Dressing Assistance: Moderate  Toileting Assistance with Device: Moderate    Activity Tolerance:  Endurance: Tolerates 10 - 20 min exercise with multiple rests  Early Mobility/Exercise Safety Screen: Proceed with mobilization - No exclusion criteria met    Balance:  Static Sitting Balance  Static Sitting-Comment/Number of Minutes: Pt tolerated EOB sitting 10 minutes, min A for static sitting and min-mod A for dynamic sitting with persistent lateral lean to right. Educated on use of yankauer and oral secretion management at EOB.  Static Standing Balance  Static Standing-Level of Assistance: Minimum assistance (+2)  Static Standing-Comment/Number of Minutes: ~30 seconds; flexed posture    Bed Mobility/Transfers: Bed Mobility  Bed Mobility: Yes  Bed Mobility 1  Bed Mobility 1: Supine to sitting, Sitting to supine  Level of Assistance 1: Maximum assistance, +2  Bed Mobility Comments 1: HOB elevated, draw sheet; cues for initiation  Bed Mobility 2  Bed Mobility  2: Rolling right  Level of Assistance 2: Minimum assistance  Bed Mobility Comments 2: cues for sequencing   and Transfers  Transfer: Yes  Transfer 1  Transfer From 1: Sit to  Transfer to 1: Stand  Transfer Level of Assistance 1: Minimum  assistance, +2  Trials/Comments 1: bilat arm in arm assist     Vision: Vision - Basic Assessment  Current Vision: Wears glasses all the time    Sensation:  Light Touch: No apparent deficits    Strength:  Strength Comments: >/=3/5     Coordination:  Movements are Fluid and Coordinated: No  Finger to Nose: Impaired     Hand Function:  Hand Function  Gross Grasp: Functional    Extremities:   RUE   RUE :  (shoulder AROM ~90 degrees, otherwise WFL),   LUE   LUE:  (shoulder AROM ~90 degrees, otherwise WFL),        Outcome Measures: [facility] Daily Activity  Putting on and taking off regular lower body clothing: A lot  Bathing (including washing, rinsing, drying): A lot  Putting on and taking off regular upper body clothing: A little  Toileting, which includes using toilet, bedpan or urinal: A lot  Taking care of personal grooming such as brushing teeth: A little  Eating Meals: A little  Daily Activity - Total Score: 15        ICU Mobility Screen  Early Mobility/Exercise Safety Screen: Proceed with mobilization - No exclusion criteria met  ICU Mobility Scale: Standing,          Education Documentation  Body Mechanics, taught by Ember Ayala OT at 5/9/2025  4:03 PM.  Learner: Patient  Readiness: Acceptance  Method: Explanation, Demonstration  Response: Needs Reinforcement  Comment: OT goals/POC    Precautions, taught by Ember Ayala OT at 5/9/2025  4:03 PM.  Learner: Patient  Readiness: Acceptance  Method: Explanation, Demonstration  Response: Needs Reinforcement  Comment: OT goals/POC    ADL Training, taught by Ember Ayala OT at 5/9/2025  4:03 PM.  Learner: Patient  Readiness: Acceptance  Method: Explanation, Demonstration  Response: Needs Reinforcement  Comment: OT goals/POC    Education Comments  No comments found.        Goals:     Encounter Problems       Encounter Problems (Active)       ADLs       Patient with complete upper body dressing with modified independent level of  assistance donning and doffing all UE clothes with no adaptive equipment       Start:  05/09/25    Expected End:  05/23/25            Patient with complete lower body dressing with stand by assist level of assistance donning and doffing all LE clothes  with PRN adaptive equipment       Start:  05/09/25    Expected End:  05/23/25            Patient will complete toileting including hygiene clothing management/hygiene with minimal assist  level of assistance.       Start:  05/09/25    Expected End:  05/23/25               COGNITION/SAFETY       Pt will be alert and oriented x 4 and CAM  (-)         Start:  05/09/25    Expected End:  05/23/25               MOBILITY       Patient will perform Functional mobility max Household distances with stand by assist level of assistance and manual wheelchair in order to improve safety and functional mobility.       Start:  05/09/25    Expected End:  05/23/25               TRANSFERS       Patient will perform bed mobility stand by assist level of assistance in order to improve safety and independence with mobility       Start:  05/09/25    Expected End:  05/23/25            Patient will complete functional transfer to wheelchair with least restrictive device with stand by assist level of assistance.       Start:  05/09/25    Expected End:  05/23/25            Patient will complete sit to stand transfer with stand by assist level of assistance and least restrictive device in order to improve safety and prepare for out of bed mobility.       Start:  05/09/25    Expected End:  05/23/25 05/09/25 at 4:04 PM   Ember Ayala, OT   Rehab Office: [phone]

## 2025-05-09 NOTE — HOSPITAL COURSE
Florentin Anderson is an 86 y/o male with history significant for a-fib (no anticoagulation), lung cancer, esophagectomy, COPD transferred to [facility] from nursing home with nausea and SOB, also had fall 3-4 days prior to presentation and multiple falls in the prior two weeks.    Found to have right 8-11th posterior mildly displaced rib fractures, JULIETA on CKD, acute pancreatitis, pneumonia, and UTI. Anesthesia consulted for pain block.  RAYMOND block on 5/8.  Admitted to ICU for close monitoring.    Escalating 02 requirements 5/9.  Aspirated evening of 5/9. Sepsis work up initiated, UA results concerning for possible underlying UTI, blood cultures (2 of 2 sets) growing MSSA, was started on empiric Zosyn/Vancomycin. On 5/10, he continued to have worsening respiratory failure was placed on Airvo HFNC and over the course of the night was maxed out on 100% FiO2 and 50 liters. Also developed worsening hypotension/shock, arterial line/central line placed, started on Levo/Vaso with gradually escalating requirements. Had goals of care discussion with healthcare power of , Vivi Gama with regards to code status, patients critically-ill state, and high likelihood of death. Code status remained DNR and no intubation per patient's prior wishes, but okay with ICU care/management otherwise. In spite of aggressive measures to hemodynamically stabilize patient with vasopressors and fluid boluses, he developed worsening metabolic acidosis, JULIETA, respiratory failure, and deteriorated to cardiac arrest with asystole on the monitor. Time of Death called at 05:30 on 5/11/2025 by trauma attending Dr. Brady. Vivi Gama was notified of patient's death via phone conversation. Post-mortem care initiated by TICU nursing team.

## 2025-05-09 NOTE — PROGRESS NOTES
Postop Pain HPI -   Palliative: relieved with IV analgesics and regional local anesthetics  Provocative: movement  Quality:  burning and aching  Radiation:  none  Severity:  1/10  Timing: constant    24-HOUR OPIOID CONSUMPTION:  Dilaudid 0.2 mg  [unfilled]    Scheduled medications  Scheduled Medications[1]  Continuous medications  Continuous Medications[2]  PRN medications  PRN Medications[3]     Physical Exam:  Constitutional:  no distress, alert and cooperative  Eyes: clear sclera  Head/Neck: No apparent injury, trachea midline  Respiratory/Thorax: Patent airways, thorax symmetric, breathing comfortably  Cardiovascular: no pitting edema  Gastrointestinal: Nondistended  Musculoskeletal: ROM intact  Extremities: no clubbing  Neurological: alert, gong x4  Psychological: Appropriate affect    Results for orders placed or performed during the hospital encounter of 05/08/25 (from the past 24 hours)   POCT GLUCOSE   Result Value Ref Range    POCT Glucose 93 74 - 99 mg/dL   Renal function panel   Result Value Ref Range    Glucose 76 74 - 99 mg/dL    Sodium 135 (L) 136 - 145 mmol/L    Potassium 4.0 3.5 - 5.3 mmol/L    Chloride 99 98 - 107 mmol/L    Bicarbonate 25 21 - 32 mmol/L    Anion Gap 15 10 - 20 mmol/L    Urea Nitrogen 79 (H) 6 - 23 mg/dL    Creatinine 4.29 (H) 0.50 - 1.30 mg/dL    eGFR 13 (L) >60 mL/min/1.73m*2    Calcium 8.5 (L) 8.6 - 10.6 mg/dL    Phosphorus 6.3 (H) 2.5 - 4.9 mg/dL    Albumin 3.0 (L) 3.4 - 5.0 g/dL   CBC and Auto Differential   Result Value Ref Range    WBC 13.0 (H) 4.4 - 11.3 x10*3/uL    nRBC 0.0 0.0 - 0.0 /100 WBCs    RBC 4.27 (L) 4.50 - 5.90 x10*6/uL    Hemoglobin 13.4 (L) 13.5 - 17.5 g/dL    Hematocrit 41.4 41.0 - 52.0 %    MCV 97 80 - 100 fL    MCH 31.4 26.0 - 34.0 pg    MCHC 32.4 32.0 - 36.0 g/dL    RDW 15.1 (H) 11.5 - 14.5 %    Platelets 195 150 - 450 x10*3/uL    Neutrophils % 84.0 40.0 - 80.0 %    Immature Granulocytes %, Automated 0.4 0.0 - 0.9 %    Lymphocytes % 4.2 13.0 - 44.0 %     Monocytes % 11.0 2.0 - 10.0 %    Eosinophils % 0.2 0.0 - 6.0 %    Basophils % 0.2 0.0 - 2.0 %    Neutrophils Absolute 10.93 (H) 1.60 - 5.50 x10*3/uL    Immature Granulocytes Absolute, Automated 0.05 0.00 - 0.50 x10*3/uL    Lymphocytes Absolute 0.54 (L) 0.80 - 3.00 x10*3/uL    Monocytes Absolute 1.43 (H) 0.05 - 0.80 x10*3/uL    Eosinophils Absolute 0.02 0.00 - 0.40 x10*3/uL    Basophils Absolute 0.03 0.00 - 0.10 x10*3/uL   Magnesium   Result Value Ref Range    Magnesium 2.31 1.60 - 2.40 mg/dL   Blood Gas Venous Full Panel   Result Value Ref Range    POCT pH, Venous 7.22 (LL) 7.33 - 7.43 pH    POCT pCO2, Venous 60 (H) 41 - 51 mm Hg    POCT pO2, Venous 15 (L) 35 - 45 mm Hg    POCT SO2, Venous 20 (L) 45 - 75 %    POCT Oxy Hemoglobin, Venous 19.8 (L) 45.0 - 75.0 %    POCT Hematocrit Calculated, Venous 42.0 41.0 - 52.0 %    POCT Sodium, Venous 132 (L) 136 - 145 mmol/L    POCT Potassium, Venous 4.0 3.5 - 5.3 mmol/L    POCT Chloride, Venous 101 98 - 107 mmol/L    POCT Ionized Calicum, Venous 1.18 1.10 - 1.33 mmol/L    POCT Glucose, Venous 102 (H) 74 - 99 mg/dL    POCT Lactate, Venous 1.4 0.4 - 2.0 mmol/L    POCT Base Excess, Venous -4.2 (L) -2.0 - 3.0 mmol/L    POCT HCO3 Calculated, Venous 24.6 22.0 - 26.0 mmol/L    POCT Hemoglobin, Venous 14.0 13.5 - 17.5 g/dL    POCT Anion Gap, Venous 10.0 10.0 - 25.0 mmol/L    Patient Temperature 37.0 degrees Celsius    FiO2 36 %   Blood Gas Venous Full Panel   Result Value Ref Range    POCT pH, Venous 7.22 (LL) 7.33 - 7.43 pH    POCT pCO2, Venous 56 (H) 41 - 51 mm Hg    POCT pO2, Venous 31 (L) 35 - 45 mm Hg    POCT SO2, Venous 50 45 - 75 %    POCT Oxy Hemoglobin, Venous 49.2 45.0 - 75.0 %    POCT Hematocrit Calculated, Venous 41.0 41.0 - 52.0 %    POCT Sodium, Venous 133 (L) 136 - 145 mmol/L    POCT Potassium, Venous 4.2 3.5 - 5.3 mmol/L    POCT Chloride, Venous 103 98 - 107 mmol/L    POCT Ionized Calicum, Venous 1.19 1.10 - 1.33 mmol/L    POCT Glucose, Venous 97 74 - 99 mg/dL    POCT  Lactate, Venous 0.9 0.4 - 2.0 mmol/L    POCT Base Excess, Venous -5.5 (L) -2.0 - 3.0 mmol/L    POCT HCO3 Calculated, Venous 22.9 22.0 - 26.0 mmol/L    POCT Hemoglobin, Venous 13.5 13.5 - 17.5 g/dL    POCT Anion Gap, Venous 11.0 10.0 - 25.0 mmol/L    Patient Temperature 37.0 degrees Celsius    FiO2 36 %   POCT GLUCOSE   Result Value Ref Range    POCT Glucose 86 74 - 99 mg/dL   Blood Gas Venous Full Panel   Result Value Ref Range    POCT pH, Venous 7.22 (LL) 7.33 - 7.43 pH    POCT pCO2, Venous 56 (H) 41 - 51 mm Hg    POCT pO2, Venous 48 (H) 35 - 45 mm Hg    POCT SO2, Venous 80 (H) 45 - 75 %    POCT Oxy Hemoglobin, Venous 78.2 (H) 45.0 - 75.0 %    POCT Hematocrit Calculated, Venous 41.0 41.0 - 52.0 %    POCT Sodium, Venous 133 (L) 136 - 145 mmol/L    POCT Potassium, Venous 3.5 3.5 - 5.3 mmol/L    POCT Chloride, Venous 103 98 - 107 mmol/L    POCT Ionized Calicum, Venous 1.26 1.10 - 1.33 mmol/L    POCT Glucose, Venous 89 74 - 99 mg/dL    POCT Lactate, Venous 0.7 0.4 - 2.0 mmol/L    POCT Base Excess, Venous -5.5 (L) -2.0 - 3.0 mmol/L    POCT HCO3 Calculated, Venous 22.9 22.0 - 26.0 mmol/L    POCT Hemoglobin, Venous 13.6 13.5 - 17.5 g/dL    POCT Anion Gap, Venous 11.0 10.0 - 25.0 mmol/L    Patient Temperature 37.0 degrees Celsius    FiO2 44 %   CBC and Auto Differential   Result Value Ref Range    WBC 11.5 (H) 4.4 - 11.3 x10*3/uL    nRBC 0.0 0.0 - 0.0 /100 WBCs    RBC 4.10 (L) 4.50 - 5.90 x10*6/uL    Hemoglobin 12.9 (L) 13.5 - 17.5 g/dL    Hematocrit 38.5 (L) 41.0 - 52.0 %    MCV 94 80 - 100 fL    MCH 31.5 26.0 - 34.0 pg    MCHC 33.5 32.0 - 36.0 g/dL    RDW 14.8 (H) 11.5 - 14.5 %    Platelets 178 150 - 450 x10*3/uL    Neutrophils % 81.5 40.0 - 80.0 %    Immature Granulocytes %, Automated 0.4 0.0 - 0.9 %    Lymphocytes % 4.7 13.0 - 44.0 %    Monocytes % 12.4 2.0 - 10.0 %    Eosinophils % 0.8 0.0 - 6.0 %    Basophils % 0.2 0.0 - 2.0 %    Neutrophils Absolute 9.39 (H) 1.60 - 5.50 x10*3/uL    Immature Granulocytes Absolute,  Automated 0.05 0.00 - 0.50 x10*3/uL    Lymphocytes Absolute 0.54 (L) 0.80 - 3.00 x10*3/uL    Monocytes Absolute 1.43 (H) 0.05 - 0.80 x10*3/uL    Eosinophils Absolute 0.09 0.00 - 0.40 x10*3/uL    Basophils Absolute 0.02 0.00 - 0.10 x10*3/uL   Magnesium   Result Value Ref Range    Magnesium 2.15 1.60 - 2.40 mg/dL   Renal function panel   Result Value Ref Range    Glucose 82 74 - 99 mg/dL    Sodium 138 136 - 145 mmol/L    Potassium 3.5 3.5 - 5.3 mmol/L    Chloride 103 98 - 107 mmol/L    Bicarbonate 24 21 - 32 mmol/L    Anion Gap 15 10 - 20 mmol/L    Urea Nitrogen 81 (H) 6 - 23 mg/dL    Creatinine 3.97 (H) 0.50 - 1.30 mg/dL    eGFR 14 (L) >60 mL/min/1.73m*2    Calcium 8.5 (L) 8.6 - 10.6 mg/dL    Phosphorus 6.0 (H) 2.5 - 4.9 mg/dL    Albumin 2.8 (L) 3.4 - 5.0 g/dL   Calcium, ionized   Result Value Ref Range    POCT Calcium, Ionized 1.23 1.1 - 1.33 mmol/L   POCT GLUCOSE   Result Value Ref Range    POCT Glucose 80 74 - 99 mg/dL   POCT GLUCOSE   Result Value Ref Range    POCT Glucose 74 74 - 99 mg/dL   POCT GLUCOSE   Result Value Ref Range    POCT Glucose 98 74 - 99 mg/dL          Florentin Anderson is a 85 y.o. year old male patient with past medical history of COPD, chronic lung mass, A-fib presenting from his nursing facility due to concerns of acute renal failure based on outpatient lab work. Presenting to ED with Hypoxic respiratory failure and acute renal injury and Right Sided posterolateral rib fractures 4-11 seen on CT. Admitted to trauma service. Acute Pain consulted for assistance with pain control.        Plan:   - Right erector spinae block with catheter performed on 5/8/2025  - Ambit ball with Ropivacaine 0.2%/NaCl 0.9% 500mL, Rate 10 cc/hr unilaterally.   - Ambit medication will not interfere with pain medication prescribed by the primary team.   - Catheter hub came off early am with patient movement, catheter tip was cleaned with chlorhexidine, clipped and new hub reconnected.   - Please be aware of local  anesthetic toxic dose and absorption variability before considering lidocaine patches  - Acute pain service will follow while catheters in place  - Rest of pain management per primary team     Acute Pain Resident  pg [phone] ph [phone]        [1] acetaminophen, 975 mg, oral, q8h SHANE  amiodarone, 200 mg, oral, Daily  heparin (porcine), 5,000 Units, subcutaneous, q8h SHANE  insulin regular, 0-10 Units, subcutaneous, TID AC  [Held by provider] lidocaine, 1 patch, transdermal, Daily  metoprolol tartrate, 25 mg, oral, BID  piperacillin-tazobactam, 2.25 g, intravenous, q6h  sennosides, 1 tablet, oral, BID  sodium chloride, 3 mL, nebulization, q6h SHANE  vancomycin, 30 mg/kg, intravenous, Once  [2] lactated Ringer's, 100 mL/hr, Last Rate: 100 mL/hr (05/09/25 0931)  ropivacaine (PF) in NS cmpd, 10 mL/hr, Last Rate: 10 mL/hr (05/08/25 1558)  [3] PRN medications: dextrose, dextrose, glucagon, glucagon, HYDROmorphone, HYDROmorphone, oxygen, polyethylene glycol, vancomycin

## 2025-05-09 NOTE — CONSULTS
Vancomycin Dosing by Pharmacy  INITIAL CONSULT      Florentin Anderson is a 85 y.o. male who Pharmacy is consulted to dose vancomycin for bacteremia.     Based on the patient's indication and renal status, this patient will be dosed based on a goal vancomycin level of 15-20.     Renal function is currently declining.    Estimated Creatinine Clearance: 12.2 mL/min (A) (by C-G formula based on SCr of 3.97 mg/dL (H)).    Results from last 7 days   Lab Units 05/09/25  0251 05/08/25  1956 05/08/25  1104 05/08/25  0147   CREATININE mg/dL 3.97* 4.29* 5.03* 5.29*   BUN mg/dL 81* 79* 77* 73*   WBC AUTO x10*3/uL 11.5* 13.0*  --  13.9*        Visit Vitals  BP 98/50   Pulse 53   Temp 36.3 °C (97.3 °F) (Temporal)   Resp 15       Gram Stain   Date/Time Value Ref Range Status   05/08/2025 09:15 AM Gram positive cocci, clusters (AA)  Preliminary     Comment:     Anaerobic Bottle Positive   05/08/2025 09:15 AM Gram positive cocci, clusters (AA)  Preliminary     Comment:     Aerobic Bottle Positive   05/08/2025 09:15 AM Gram positive cocci, clusters (AA)  Preliminary     Comment:     Aerobic Bottle Positive     Urine Culture   Date/Time Value Ref Range Status   05/08/2025 03:22 AM   Final    Growth indicates contamination with Gram positive sancho. Repeat culture if clinically indicated.     Blood Culture   Date/Time Value Ref Range Status   05/08/2025 09:15 AM   Preliminary    Identification and susceptibility testing to follow   05/08/2025 09:15 AM   Preliminary    Identification and susceptibility testing to follow        No results found for the last 90 days.      Assessment/Plan     Will order 1750 mg for one dose only.    Vancomycin follow-up level will be ordered for 5/10 at 10 AM , unless clinically indicated sooner.  Will continue to monitor renal function daily while on vancomycin and order serum creatinine at least every 48 hours if not already ordered.  Will follow for continued vancomycin needs, clinical response, and  signs/symptoms of toxicity.       Pa Merchant, PharmD

## 2025-05-10 NOTE — CARE PLAN
Problem: Pain - Adult  Goal: Verbalizes/displays adequate comfort level or baseline comfort level  Outcome: Progressing  Flowsheets (Taken 5/10/2025 1754)  Verbalizes/displays adequate comfort level or baseline comfort level:   Encourage patient to monitor pain and request assistance   Assess pain using appropriate pain scale   Administer analgesics based on type and severity of pain and evaluate response   Implement non-pharmacological measures as appropriate and evaluate response   Consider cultural and social influences on pain and pain management   Notify Licensed Independent Practitioner if interventions unsuccessful or patient reports new pain     Problem: Discharge Planning  Goal: Discharge to home or other facility with appropriate resources  Outcome: Progressing  Flowsheets (Taken 5/10/2025 1754)  Discharge to home or other facility with appropriate resources:   Identify barriers to discharge with patient and caregiver   Identify discharge learning needs (meds, wound care, etc)   Refer to discharge planning if patient needs post-hospital services based on physician order or complex needs related to functional status, cognitive ability or social support system     Problem: Chronic Conditions and Co-morbidities  Goal: Patient's chronic conditions and co-morbidity symptoms are monitored and maintained or improved  Outcome: Progressing  Flowsheets (Taken 5/10/2025 1754)  Care Plan - Patient's Chronic Conditions and Co-Morbidity Symptoms are Monitored and Maintained or Improved:   Monitor and assess patient's chronic conditions and comorbid symptoms for stability, deterioration, or improvement   Update acute care plan with appropriate goals if chronic or comorbid symptoms are exacerbated and prevent overall improvement and discharge   Collaborate with multidisciplinary team to address chronic and comorbid conditions and prevent exacerbation or deterioration     Problem: Nutrition  Goal: Nutrient intake  appropriate for maintaining nutritional needs  Outcome: Progressing     Problem: Respiratory  Goal: Clear secretions with interventions this shift  Outcome: Progressing  Flowsheets (Taken 5/10/2025 1754)  Clear secretions with interventions this shift:   Encourage/provide pulmonary hygiene/secretion clearance   Med administration/monitoring of effect   Incentive spirometry   Suctioning  Goal: Minimize anxiety/maximize coping throughout shift  Outcome: Progressing  Flowsheets (Taken 5/10/2025 1754)  Minimize anxiety/maximize coping throughout shift:   Med administration/monitoring of effect   Monitor pain/anxiety level  Goal: Minimal/no exertional discomfort or dyspnea this shift  Outcome: Progressing  Flowsheets (Taken 5/10/2025 1754)  Minimal/no exertional discomfort or dyspnea this shift: Positioning to promote ventilation/comfort  Goal: No signs of respiratory distress (eg. Use of accessory muscles. Peds grunting)  Outcome: Progressing  Flowsheets (Taken 5/10/2025 1754)  No signs of respiratory distress (eg. Use of accessory muscles. Peds grunting: Monitor maternal/fetal well-being  Goal: Tolerate pulmonary toileting this shift  Outcome: Progressing  Flowsheets (Taken 5/10/2025 1754)  Tolerate pulmonary toileting this shift: Positioning to promote ventilation/comfort  Goal: Verbalize decreased shortness of breath this shift  Outcome: Progressing  Flowsheets (Taken 5/10/2025 1754)  Verbalize decreased shortness of breath this shift:   Encourage/provide pulmonary hygiene/secretion clearance   Suctioning   Incentive spirometry  Goal: Wean oxygen to maintain O2 saturation per order/standard this shift  Outcome: Progressing  Flowsheets (Taken 5/10/2025 1754)  Wean oxygen to maintain O2 saturation per order/standard this shift:   Encourage activity/mobility   Incentive spirometry  Goal: Increase self care and/or family involvement in next 24 hours  Outcome: Progressing  Flowsheets (Taken 5/10/2025 1754)  Increase self  care and/or family involvement in next 24 hours:   Asthma home management plan   Encourage activity/mobility     Problem: Skin  Goal: Decreased wound size/increased tissue granulation at next dressing change  Outcome: Progressing  Flowsheets (Taken 5/9/2025 0827 by Tsering Young, RN)  Decreased wound size/increased tissue granulation at next dressing change:   Promote sleep for wound healing   Protective dressings over bony prominences   Utilize specialty bed per algorithm  Goal: Participates in plan/prevention/treatment measures  Outcome: Progressing  Flowsheets (Taken 5/10/2025 1754)  Participates in plan/prevention/treatment measures:   Discuss with provider PT/OT consult   Elevate heels   Increase activity/out of bed for meals  Goal: Prevent/manage excess moisture  Outcome: Progressing  Flowsheets (Taken 5/9/2025 0827 by Tsering Young, RN)  Prevent/manage excess moisture:   Cleanse incontinence/protect with barrier cream   Monitor for/manage infection if present   Follow provider orders for dressing changes   Moisturize dry skin  Goal: Prevent/minimize sheer/friction injuries  Outcome: Progressing  Flowsheets (Taken 5/9/2025 0827 by Tsering Young, RN)  Prevent/minimize sheer/friction injuries:   Complete micro-shifts as needed if patient unable. Adjust patient position to relieve pressure points, not a full turn   Increase activity/out of bed for meals   Use pull sheet   HOB 30 degrees or less   Turn/reposition every 2 hours/use positioning/transfer devices   Utilize specialty bed per algorithm  Goal: Promote/optimize nutrition  Outcome: Progressing  Flowsheets (Taken 5/9/2025 0827 by Tsering Young, RN)  Promote/optimize nutrition:   Assist with feeding   Monitor/record intake including meals   Consume > 50% meals/supplements   Offer water/supplements/favorite foods   Discuss with provider if NPO > 2 days   Reassess MST if dietician not consulted  Goal: Promote skin healing  Outcome: Progressing  Flowsheets (Taken  5/9/2025 0827 by Tsering Young RN)  Promote skin healing:   Assess skin/pad under line(s)/device(s)   Protective dressings over bony prominences   Turn/reposition every 2 hours/use positioning/transfer devices   Ensure correct size (line/device) and apply per  instructions   Rotate device position/do not position patient on device     Problem: Fall/Injury  Goal: Verbalize understanding of personal risk factors for fall in the hospital  Outcome: Progressing  Goal: Verbalize understanding of risk factor reduction measures to prevent injury from fall in the home  Outcome: Progressing  Goal: Use assistive devices by end of the shift  Outcome: Progressing  Goal: Pace activities to prevent fatigue by end of the shift  Outcome: Progressing   The patient's goals for the shift include      Patient will remain comfortable and hemodynamically stable throughout the shift.

## 2025-05-10 NOTE — PROGRESS NOTES
Postop Pain HPI -   Palliative: relieved with IV analgesics and regional local anesthetics  Provocative: movement  Quality:  burning and aching  Radiation:  none  Severity:  5/10  Timing: constant    24-HOUR OPIOID CONSUMPTION:  0  [unfilled]    Scheduled medications  Scheduled Medications[1]  Continuous medications  Continuous Medications[2]  PRN medications  PRN Medications[3]     Physical Exam:  Constitutional:  no distress, alert and cooperative  Eyes: clear sclera  Head/Neck: No apparent injury, trachea midline  Respiratory/Thorax: Patent airways, thorax symmetric, breathing comfortably  Cardiovascular: no pitting edema  Gastrointestinal: Nondistended  Musculoskeletal: ROM intact  Extremities: no clubbing  Neurological: alert, gong x4  Psychological: Appropriate affect    Results for orders placed or performed during the hospital encounter of 05/08/25 (from the past 24 hours)   POCT GLUCOSE   Result Value Ref Range    POCT Glucose 98 74 - 99 mg/dL   Transthoracic Echo Complete   Result Value Ref Range    AV pk roney 1.39 m/s    AV mn grad 3 mmHg    LVOT diam 1.90 cm    MV E/A ratio 0.90     LA vol index A/L 19.9 ml/m2    Tricuspid annular plane systolic excursion 2.3 cm    LV EF 54 %    RV free wall pk S' 14.00 cm/s    LVIDd 3.84 cm    RVSP 39.0 mmHg    Aortic Valve Area by Continuity of VTI 2.04 cm2    Aortic Valve Area by Continuity of Peak Velocity 1.86 cm2    AV pk grad 8 mmHg    LV A4C EF 58.3    Renal Function Panel   Result Value Ref Range    Glucose 108 (H) 74 - 99 mg/dL    Sodium 137 136 - 145 mmol/L    Potassium 4.3 3.5 - 5.3 mmol/L    Chloride 103 98 - 107 mmol/L    Bicarbonate 26 21 - 32 mmol/L    Anion Gap 12 10 - 20 mmol/L    Urea Nitrogen 80 (H) 6 - 23 mg/dL    Creatinine 3.39 (H) 0.50 - 1.30 mg/dL    eGFR 17 (L) >60 mL/min/1.73m*2    Calcium 8.4 (L) 8.6 - 10.6 mg/dL    Phosphorus 4.9 2.5 - 4.9 mg/dL    Albumin 2.7 (L) 3.4 - 5.0 g/dL   POCT GLUCOSE   Result Value Ref Range    POCT Glucose 93 74  - 99 mg/dL   POCT GLUCOSE   Result Value Ref Range    POCT Glucose 93 74 - 99 mg/dL   Blood Gas Arterial Full Panel   Result Value Ref Range    POCT pH, Arterial 7.20 (LL) 7.38 - 7.42 pH    POCT pCO2, Arterial 54 (H) 38 - 42 mm Hg    POCT pO2, Arterial 83 (L) 85 - 95 mm Hg    POCT SO2, Arterial 97 94 - 100 %    POCT Oxy Hemoglobin, Arterial 95.0 94.0 - 98.0 %    POCT Hematocrit Calculated, Arterial 40.0 (L) 41.0 - 52.0 %    POCT Sodium, Arterial 132 (L) 136 - 145 mmol/L    POCT Potassium, Arterial 4.1 3.5 - 5.3 mmol/L    POCT Chloride, Arterial 105 98 - 107 mmol/L    POCT Ionized Calcium, Arterial 1.30 1.10 - 1.33 mmol/L    POCT Glucose, Arterial 134 (H) 74 - 99 mg/dL    POCT Lactate, Arterial 0.8 0.4 - 2.0 mmol/L    POCT Base Excess, Arterial -7.4 (L) -2.0 - 3.0 mmol/L    POCT HCO3 Calculated, Arterial 21.1 (L) 22.0 - 26.0 mmol/L    POCT Hemoglobin, Arterial 13.4 (L) 13.5 - 17.5 g/dL    POCT Anion Gap, Arterial 10 10 - 25 mmo/L    Patient Temperature 37.0 degrees Celsius    FiO2 60 %   CBC and Auto Differential   Result Value Ref Range    WBC 13.1 (H) 4.4 - 11.3 x10*3/uL    nRBC 0.0 0.0 - 0.0 /100 WBCs    RBC 3.93 (L) 4.50 - 5.90 x10*6/uL    Hemoglobin 12.5 (L) 13.5 - 17.5 g/dL    Hematocrit 36.4 (L) 41.0 - 52.0 %    MCV 93 80 - 100 fL    MCH 31.8 26.0 - 34.0 pg    MCHC 34.3 32.0 - 36.0 g/dL    RDW 14.8 (H) 11.5 - 14.5 %    Platelets 174 150 - 450 x10*3/uL    Neutrophils % 82.2 40.0 - 80.0 %    Immature Granulocytes %, Automated 0.8 0.0 - 0.9 %    Lymphocytes % 6.5 13.0 - 44.0 %    Monocytes % 8.8 2.0 - 10.0 %    Eosinophils % 1.5 0.0 - 6.0 %    Basophils % 0.2 0.0 - 2.0 %    Neutrophils Absolute 10.80 (H) 1.60 - 5.50 x10*3/uL    Immature Granulocytes Absolute, Automated 0.10 0.00 - 0.50 x10*3/uL    Lymphocytes Absolute 0.85 0.80 - 3.00 x10*3/uL    Monocytes Absolute 1.15 (H) 0.05 - 0.80 x10*3/uL    Eosinophils Absolute 0.20 0.00 - 0.40 x10*3/uL    Basophils Absolute 0.03 0.00 - 0.10 x10*3/uL   Magnesium   Result  Value Ref Range    Magnesium 2.11 1.60 - 2.40 mg/dL   Renal function panel   Result Value Ref Range    Glucose 96 74 - 99 mg/dL    Sodium 139 136 - 145 mmol/L    Potassium 4.8 3.5 - 5.3 mmol/L    Chloride 108 (H) 98 - 107 mmol/L    Bicarbonate 24 21 - 32 mmol/L    Anion Gap 12 10 - 20 mmol/L    Urea Nitrogen 76 (H) 6 - 23 mg/dL    Creatinine 2.81 (H) 0.50 - 1.30 mg/dL    eGFR 22 (L) >60 mL/min/1.73m*2    Calcium 8.3 (L) 8.6 - 10.6 mg/dL    Phosphorus 3.7 2.5 - 4.9 mg/dL    Albumin 2.5 (L) 3.4 - 5.0 g/dL   Calcium, ionized   Result Value Ref Range    POCT Calcium, Ionized 1.20 1.1 - 1.33 mmol/L   POCT GLUCOSE   Result Value Ref Range    POCT Glucose 153 (H) 74 - 99 mg/dL        Florentin Anderson is a 85 y.o. year old male patient with past medical history of COPD, chronic lung mass, A-fib presenting from his nursing facility due to concerns of acute renal failure based on outpatient lab work. Presenting to ED with Hypoxic respiratory failure and acute renal injury and Right Sided posterolateral rib fractures 4-11 seen on CT. Admitted to trauma service. Acute Pain consulted for assistance with pain control.         Plan:   - Right erector spinae block with catheter performed on 5/8/2025  - Ambit ball with Ropivacaine 0.2%/NaCl 0.9% 500mL, Rate 10 cc/hr unilaterally. Refilled today. Given 5 ml bolus of 0.5 Ropivacaine.   - Ambit medication will not interfere with pain medication prescribed by the primary team.   - Please be aware of local anesthetic toxic dose and absorption variability before considering lidocaine patches  - Acute pain service will follow while catheters in place  - Rest of pain management per primary team     Acute Pain Resident  pg [phone] ph [phone]          [1] acetaminophen, 1,000 mg, intravenous, q6h  amiodarone, 200 mg, oral, Daily  heparin (porcine), 5,000 Units, subcutaneous, q8h SHANE  insulin regular, 0-10 Units, subcutaneous, TID AC  ipratropium-albuteroL, 3 mL, nebulization, q6h  [Held by provider]  lidocaine, 1 patch, transdermal, Daily  metoprolol tartrate, 25 mg, oral, BID  piperacillin-tazobactam, 2.25 g, intravenous, q6h  sennosides, 1 tablet, oral, BID  sodium chloride, 3 mL, nebulization, q6h SHANE  [2] lactated Ringer's, 75 mL/hr, Last Rate: 75 mL/hr (05/09/25 1402)  ropivacaine (PF) in NS cmpd, 10 mL/hr, Last Rate: 10 mL/hr (05/08/25 1558)  [3] PRN medications: dextrose, dextrose, glucagon, glucagon, oxyCODONE, oxyCODONE, oxygen, polyethylene glycol, vancomycin

## 2025-05-10 NOTE — PROGRESS NOTES
[facility]  TRAUMA ICU - PROGRESS NOTE    Patient Name: Florentin Anderson  MRN: [id number]  Admit Date: 508  : 1940  AGE: 85 y.o.   GENDER: male  ==============================================================================   [###USE THIS SECTION FOR TRAUMA PATIENTS ONLY###]  MECHANISM OF INJURY:   85M with hx COPD and tobacco use presents from SNF with shortness of breath and nausea (currently resolved). Had multiple weeks of mechanical falls, most recent 3-4 days ago. Found to have multiple right sided rib fractures, prompting trauma consult. Does c/o trouble breathing with new oxygen requirement, associated R sided chest pain.  LOC (yes/no?): yes  Anticoagulant / Anti-platelet Rx? 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 Penetrating atheromatous ulcer      PROCEDURES:  : R RAYMOND block    ==============================================================================  TODAY'S ASSESSMENT AND PLAN OF CARE:  Florentin Anderson is a 85 y.o. male in the ICU due to: respiratory monitoring     NEURO/PAIN/SEDATION:   - Minimize sedating medications  - Delirium prevention measures, control day/night cycle as able   - Analgesia: tylenol 975mg q8hr scheduled, dilaudid 0.2 nd 0.4 prn q4hr, RAYMOND block  - Sedation: none     RESPIRATORY:   #Rib fxs, hx COPD, possible CAP   - Maintain SpO2 >97%, supplemental O2 PRN  - Continuous pulse ox  -Requiring escalating oxygen requirements throughout the day, now on HighFLow at 80%  - Encourage IS use every hour while awake, encourage upright sitting position  - Encourage cough and deep breathing  -tid bronchial hygiene for now due to low  IS   -on zosyn for CAP      CARDIOVASC:   #hx of afib (no AC)  - Continuous cardiac monitoring  - Ongoing hemodynamic monitoring  - MAP goal > 65mmHg  - HOLD home amiodarone 200mg daily for NPO  - HOLD home metoprolol tartrate 50mg BID, now IV metop q6     GI:   #pancreatitis   - NPO currently for concern of aspiration  - BR with senna and miralax  - received 1L bolus of LR, on LR at 75 ml/hr      :   #UTI, JULIETA on CKD  -on zosyn day #1/7  -pending urine culture   - Crews/External catheter in place, maintain / Voiding spontaneously clear yellow urine   - Maintain UOP 0.5-1.0 ml/kg/hr  - Strict I&Os      FEN:   - NPO  - mIVF, LR @ 75/hr  - Monitor and replete electrolytes as clinically indicated, Mg > 2 and K > 4  - AM labs      HEMATOLOGIC:   - Monitor for s/sx of anemia  - Trend labs, transfuse as clinically indicated, maintain Hgb > 7      ENDOCRINE:   - SSI, BG goal < 180  - Q4h Accuchecks  - Hypoglycemia protocol      MUSCULOSKELETAL/SKIN:   -ICU skin protocol      INFECTIOUS DISEASE:   - MSSA bacteremia  - Afebrile, on zosyn for UTI and developing pneumonia  - Continue to monitor WBC count and vitals   -pending urine cultures      GI PROPHYLAXIS:   -not indicated at this time      DVT PROPHYLAXIS:   -heparin 5,000 units q8hr  -SCDs     DISPOSITION: Continue TICU care   ==============================================================================  CHIEF COMPLAINT / OVERNIGHT EVENTS / HPI:   Patient was seen laying in bed with O2 Venti mask at 15L appearing comfortable.  -Patient has no complaints today. Endorses occasional shortness of breath when coughing. Denies pain.     MEDICAL HISTORY / ROS:  Admission history and ROS reviewed. Pertinent changes as follows:  As above     PHYSICAL EXAM:  Heart Rate:  [53-74]   Temp:  [35.8 °C (96.4 °F)-36.3 °C (97.3 °F)]   Resp:  [15-22]   BP: ()/(42-98)   SpO2:  [76 %-100 %]   Physical Exam  Constitutional:       General: He is not in acute distress.     Appearance:  He is frail appearing. He is not toxic-appearing.   HENT:      Head: Normocephalic.      Right Ear: External ear normal.      Left Ear: External ear normal.      Nose: Nose normal.      Mouth/Throat:      Mouth: Mucous membranes are dry.   Eyes:      Extraocular Movements: Extraocular movements intact.      Pupils: Pupils are equal, round, and reactive to light.   Cardiovascular:      Rate and Rhythm: Normal rate and regular rhythm.      Pulses: Normal pulses.   Pulmonary:      Effort: Pulmonary effort is normal. No respiratory distress.      Breath sounds: Rhonchi present. No wheezing.      Comments: Rhonci present in right lung fields, less in left lung fields. Coarse airway sounds noted in R>L lobes  Abdominal:      General: There is no distension.      Palpations: Abdomen is soft.      Tenderness: There is no abdominal tenderness.   Musculoskeletal:         General: Normal range of motion.   Skin:     General: Skin is warm and dry.   Neurological:      Mental Status: He is alert and oriented to person, place, and time. Mental status is at baseline.      Comments: 5/5 BUE  strength and 5/5 dorsi/plantar flexion    Psychiatric:         Mood and Affect: Mood normal.         Behavior: Behavior normal    IMAGING SUMMARY:  (summary of new imaging findings, not a copy of dictation)  CXR 5/10: Worsening RLL pulmonary effusion and infiltrates    LABS:  Results from last 7 days   Lab Units 05/10/25  0212 05/09/25  0251 05/08/25  1956   WBC AUTO x10*3/uL 13.1* 11.5* 13.0*   HEMOGLOBIN g/dL 12.5* 12.9* 13.4*   HEMATOCRIT % 36.4* 38.5* 41.4   PLATELETS AUTO x10*3/uL 174 178 195   NEUTROS PCT AUTO % 82.2 81.5 84.0   LYMPHS PCT AUTO % 6.5 4.7 4.2   MONOS PCT AUTO % 8.8 12.4 11.0   EOS PCT AUTO % 1.5 0.8 0.2         Results from last 7 days   Lab Units 05/10/25  0212 05/09/25  1409 05/09/25  0251 05/08/25  1104 05/08/25  0147   SODIUM mmol/L 139 137 138   < > 136   POTASSIUM mmol/L 4.8 4.3 3.5   < > 4.1   CHLORIDE mmol/L  108* 103 103   < > 101   CO2 mmol/L 24 26 24   < > 19*   BUN mg/dL 76* 80* 81*   < > 73*   CREATININE mg/dL 2.81* 3.39* 3.97*   < > 5.29*   CALCIUM mg/dL 8.3* 8.4* 8.5*   < > 8.5*   PROTEIN TOTAL g/dL  --   --   --   --  7.6   BILIRUBIN TOTAL mg/dL  --   --   --   --  1.7*   ALK PHOS U/L  --   --   --   --  73   ALT U/L  --   --   --   --  39   AST U/L  --   --   --   --  28   GLUCOSE mg/dL 96 108* 82   < > 125*    < > = values in this interval not displayed.     Results from last 7 days   Lab Units 05/08/25  0147   BILIRUBIN TOTAL mg/dL 1.7*     Results from last 7 days   Lab Units 05/09/25  2035   POCT PH, ARTERIAL pH 7.20*   POCT PCO2, ARTERIAL mm Hg 54*   POCT PO2, ARTERIAL mm Hg 83*   POCT HCO3 CALCULATED, ARTERIAL mmol/L 21.1*   POCT BASE EXCESS, ARTERIAL mmol/L -7.4*     I have reviewed all medications, laboratory results, and imaging pertinent for today's encounter.     Discussion at beside remains DNI pulmonary hygiene maneuvers Hi Flow  poor inspiratory effort he is awake alert conversant and resolute in DNI status   seen discussed with team and examined on rounds this AM .   Attention to pressors ventilation sedation anticipate return to OR in AM   This critically ill patient  continues  to be at-risk for clinically significant deterioration / failure due to the above mentioned dysfunctional, unstable organ systems.  I have personally identified and managed all complex critical care issues to prevent aforementioned clinical deterioration.  Critical care time is spent at bedside and/or the     radiographs, serial assessments of hemodynamics, respiratory status, ventilatory management, and family updates.  Time spent in procedures and teaching are reported separately.     CRITICAL CARE TIME: 35 minutes    Jerry Domínguez MD

## 2025-05-10 NOTE — PROGRESS NOTES
[facility]  TRAUMA SERVICE - PROGRESS NOTE    Patient Name: Florentin Anderson  MRN: [id number]  Admit Date: 508  : 1940  AGE: 85 y.o.   GENDER: male  ==============================================================================  MECHANISM OF INJURY / CHIEF COMPLAINT:   85M with hx COPD and tobacco use presents from SNF with shortness of breath and nausea (currently resolved). Had multiple weeks of mechanical falls, most recent 3-4 days ago. Found to have multiple right sided rib fractures, prompting trauma consult. Does c/o trouble breathing with new oxygen requirement, associated R sided chest pain. +brief LOC, no blood thinners.      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 Penetrating atheromatous ulcer    PROCEDURES:   RAYMOND block     ==============================================================================  TODAY'S ASSESSMENT AND PLAN OF CARE:  Continue vanc/zosyn ( blood cx positive for staph aureus 2/2) -> will need echo and ID consult   Continue following MRSA swab   Wean O2 as tolerated     ==============================================================================  CHIEF COMPLAINT / OVERNIGHT EVENTS:   Possible aspiration event overnight, pt placed on 15L mask. On vanc/zosyn.     MEDICAL HISTORY / ROS:  Admission history and ROS reviewed. Pertinent changes as follows:      PHYSICAL EXAM:  Heart Rate:  [53-91]   Temp:  [35.8 °C (96.4 °F)-36.3 °C (97.3 °F)]   Resp:  [15-22]   BP: ()/(42-98)   SpO2:  [76 %-100 %]   Physical Exam  Constitutional: no acute distress, frail   Neuro: A/O x4, no gross deficits   Psych: normal affect  HEENT: No deformities, no scleral icterus   Cardiac: RRR  Chest: pain on palpation of right lateral  chest   Pulmonary: labored respirations on 15L mask   Abdomen: soft, non distended, non tender  Skin: warm and dry overall    Extremities: no swelling noted  MSK: moving all four    IMAGING SUMMARY:  (summary of new imaging findings, not a copy of dictation)      LABS:  Results from last 7 days   Lab Units 05/10/25  0212 05/09/25  0251 05/08/25 1956   WBC AUTO x10*3/uL 13.1* 11.5* 13.0*   HEMOGLOBIN g/dL 12.5* 12.9* 13.4*   HEMATOCRIT % 36.4* 38.5* 41.4   PLATELETS AUTO x10*3/uL 174 178 195   NEUTROS PCT AUTO % 82.2 81.5 84.0   LYMPHS PCT AUTO % 6.5 4.7 4.2   MONOS PCT AUTO % 8.8 12.4 11.0   EOS PCT AUTO % 1.5 0.8 0.2         Results from last 7 days   Lab Units 05/10/25  0212 05/09/25  1409 05/09/25  0251 05/08/25  1104 05/08/25  0147   SODIUM mmol/L 139 137 138   < > 136   POTASSIUM mmol/L 4.8 4.3 3.5   < > 4.1   CHLORIDE mmol/L 108* 103 103   < > 101   CO2 mmol/L 24 26 24   < > 19*   BUN mg/dL 76* 80* 81*   < > 73*   CREATININE mg/dL 2.81* 3.39* 3.97*   < > 5.29*   CALCIUM mg/dL 8.3* 8.4* 8.5*   < > 8.5*   PROTEIN TOTAL g/dL  --   --   --   --  7.6   BILIRUBIN TOTAL mg/dL  --   --   --   --  1.7*   ALK PHOS U/L  --   --   --   --  73   ALT U/L  --   --   --   --  39   AST U/L  --   --   --   --  28   GLUCOSE mg/dL 96 108* 82   < > 125*    < > = values in this interval not displayed.     Results from last 7 days   Lab Units 05/08/25  0147   BILIRUBIN TOTAL mg/dL 1.7*     Results from last 7 days   Lab Units 05/09/25 2035   POCT PH, ARTERIAL pH 7.20*   POCT PCO2, ARTERIAL mm Hg 54*   POCT PO2, ARTERIAL mm Hg 83*   POCT HCO3 CALCULATED, ARTERIAL mmol/L 21.1*   POCT BASE EXCESS, ARTERIAL mmol/L -7.4*       I have reviewed all medications, laboratory results, and imaging pertinent for today's encounter.

## 2025-05-10 NOTE — SIGNIFICANT EVENT
Patient with ongoing hypoxia in spite of receiving Airvo, now at 80% FiO2. Mental status waxes and wanes.     On exam, patient with rhonchi bilaterally; NT suctioning did not yield any secretions.     AB.23/44/67/18/-9; essentially unchanged with more of a metabolic component due to JULIETA.     Overall, patient's respiratory status is tenuous with pre-existing DNR/DNI noted. Attempted to contact appointed individuals to provide update:  Bennie and Vivi Gama. Bennie's number is incorrect and Vivi's is out of service. No other individuals listed.     Will continue supportive care measures and monitor closely. Will also ask social work if they can find any other individuals who may be involved in the patient's life.     Margaret Brady MD

## 2025-05-10 NOTE — PROGRESS NOTES
Vancomycin Dosing by Pharmacy- Cessation of Therapy    Consult to pharmacy for vancomycin dosing has been discontinued by the prescriber, pharmacy will sign off at this time.    Please call pharmacy if there are further questions or re-enter a consult if vancomycin is resumed.     Chance Duncan, PharmD    20-Jul-2023 11:07

## 2025-05-10 NOTE — CONSULTS
Inpatient consult to Infectious Diseases  Consult performed by: Michelle Carney MD  Consult ordered by: Jerry Domínguez MD        Primary MD: No primary care provider on file.  Reason For Consult Co-management of S.aureus bacteremia     History Of Present Illness  Florentin Anderson is a 85 y.o. male with PMH significant for Afib (no AC), esophagectomy, COPD (no home oxygen) presented from SNF with shortness of breath and nausea on 5/8.     From the chart review, patient had multiple weeks of mechanical falls, most recent 3-4 days ago. He was found to have multiple right sided rib fractures, prompting trauma consult. Patient was admitted on 5/8 due to trouble breathing with new oxygen requirement, associated R sided chest pain. CT chest showed new right perihilar possible atelectasis due to chronic mucus plugging and aspiration pneumonia, and CT A/P showed acute pancreatitis. Was started on zosyn. Urine culture was negative. Blood culture 5/8 was positive for S. Aureus. TTE on 5/8 did not show definite valvular vegetations. CT whole spine did not show obvious discitis or osteomyelitis.      Patient had had dizziness over the last couple of days and multiple falls. Also he had intermittent back pain. Denied fever, chills, cough, urinary symptom, or joint pain. No hx of hardware or cardiac device.     Past Medical History  He has a past medical history of Closed fracture of distal clavicle (10/25/2023), Giant cell arteritis (Multi) (10/25/2023), Macula scars of posterior pole (postinflammatory) (post-traumatic), right eye (07/10/2019), Mass of right lung (10/25/2023), Paroxysmal atrial fibrillation (Multi), Rib fracture (10/25/2023), and UTI (urinary tract infection).    Surgical History  He has a past surgical history that includes Chest surgery.     Social History     Occupational History    Not on file   Tobacco Use    Smoking status: Every Day     Current packs/day: 0.50     Average packs/day: 0.5 packs/day for 60.0 years  (30.0 ttl pk-yrs)     Types: Cigarettes    Smokeless tobacco: Never   Substance and Sexual Activity    Alcohol use: Never    Drug use: Never    Sexual activity: Not on file     Family History  Family History[1]  Allergies  Patient has no known allergies.     Immunization History   Administered Date(s) Administered    Flu vaccine, quadrivalent, high-dose, preservative free, age 65y+ (FLUZONE) 09/10/2020, 11/09/2021    Flu vaccine, trivalent, preservative free, HIGH-DOSE, age 65y+ (Fluzone) 10/16/2019    Influenza, Unspecified 10/10/2011, 10/14/2013    Influenza, seasonal, injectable 10/06/2008, 10/13/2009, 10/19/2010, 10/08/2012    Pneumococcal polysaccharide vaccine, 23-valent, age 2 years and older (PNEUMOVAX 23) 03/18/2010    Tdap vaccine, age 7 year and older (BOOSTRIX, ADACEL) 07/22/2013    Zoster, live 11/05/2013     Medications  Home medications:  Prescriptions Prior to Admission[2]  Current medications:  Scheduled medications  Scheduled Medications[3]  Continuous medications  Continuous Medications[4]  PRN medications  PRN Medications[5]    Review of Systems     Objective  Range of Vitals (last 24 hours)  Heart Rate:  [53-74]   Temp:  [35.8 °C (96.4 °F)-36.3 °C (97.3 °F)]   Resp:  [15-22]   BP: ()/(42-98)   SpO2:  [76 %-100 %]   Daily Weight  05/08/25 : 61 kg (134 lb 7.7 oz)    Body mass index is 19.3 kg/m².     Physical Exam  General: alert, able to answer questions. HF mask oxygen.   HEENT: no conjunctival injection. anicteric. No petechiae   CVS: RRR. Normal S1 and S2. No m/r/g.   RESP: ctab no w/r/r, no increased wob  Abd: Soft and lax. ND.   No tenderness to tap on whole spine  Ext: No swelling of the LE b/l.   Integumentary: no obvious lesions     Relevant Results    Labs  Results from last 72 hours   Lab Units 05/10/25  0212 05/09/25  0251 05/08/25  1956   WBC AUTO x10*3/uL 13.1* 11.5* 13.0*   HEMOGLOBIN g/dL 12.5* 12.9* 13.4*   HEMATOCRIT % 36.4* 38.5* 41.4   PLATELETS AUTO x10*3/uL 174 178 195  "  NEUTROS PCT AUTO % 82.2 81.5 84.0   LYMPHS PCT AUTO % 6.5 4.7 4.2   MONOS PCT AUTO % 8.8 12.4 11.0   EOS PCT AUTO % 1.5 0.8 0.2     Results from last 72 hours   Lab Units 05/10/25  0212 05/09/25  1409 05/09/25  0251   SODIUM mmol/L 139 137 138   POTASSIUM mmol/L 4.8 4.3 3.5   CHLORIDE mmol/L 108* 103 103   CO2 mmol/L 24 26 24   BUN mg/dL 76* 80* 81*   CREATININE mg/dL 2.81* 3.39* 3.97*   GLUCOSE mg/dL 96 108* 82   CALCIUM mg/dL 8.3* 8.4* 8.5*   ANION GAP mmol/L 12 12 15   EGFR mL/min/1.73m*2 22* 17* 14*   PHOSPHORUS mg/dL 3.7 4.9 6.0*     Results from last 72 hours   Lab Units 05/10/25  0212 05/09/25  1409 05/09/25  0251 05/08/25  1104 05/08/25  0147   ALK PHOS U/L  --   --   --   --  73   BILIRUBIN TOTAL mg/dL  --   --   --   --  1.7*   PROTEIN TOTAL g/dL  --   --   --   --  7.6   ALT U/L  --   --   --   --  39   AST U/L  --   --   --   --  28   ALBUMIN g/dL 2.5* 2.7* 2.8*   < > 3.6    < > = values in this interval not displayed.     Estimated Creatinine Clearance: 17.2 mL/min (A) (by C-G formula based on SCr of 2.81 mg/dL (H)).  CRP   Date Value Ref Range Status   06/21/2019 0.32 mg/dL Final     Comment:     REF VALUE  < 1.00       Sedimentation Rate   Date Value Ref Range Status   06/21/2019 6 0 - 20 mm/h Final     No results found for: \"HIV1X2\", \"HIVCONF\", \"SUIYEY1HY\"  Hepatitis C Ab   Date Value Ref Range Status   04/05/2022 NONREACTIVE NONREACTIVE Final     Comment:      Results from patients taking biotin supplements or receiving   high-dose biotin therapy should be interpreted with caution   due to possible interference with this test. Providers may    contact their local laboratory for further information.     Imaging  CT C/A/P 5/8  IMPRESSION:  1. Findings of acute pancreatitis. Lack of intravenous contrast  precludes evaluation for pancreatic necrosis. No definite fluid  collection.  2. Right posterolateral 8th through 11th mildly displaced rib  fractures.  3. Right basilar segments of mucous plugging " with subpleural  consolidative opacities, most likely atelectasis and retained  secretions, but aspiration is a differential consideration.  4. Postoperative changes of esophagectomy with gastric pull-through  with a hiatal hernia that contains part of the pancreatic tail.  5. 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  CT spine cervical/thoracic/lumber 5/8  IMPRESSION:  1. Compared to October 2023: Unchanged anterior wedge compression deformity of the T10 and T11 vertebral bodies as described above. There is similar 0.5 cm of osseous retropulsion of the superior endplate of T11.  2. Mild degenerative changes of the thoracic and lumbar spine as  described above.    Microbiology  5/8 Urine neg        Blood MSSA         MRSA nare    Antimicrobial agents  5/7- Zosyn   5/9- Vancomycin     Assessment/Plan  # MSSA bacteremia   # Possible pneumonia due to mucus plugging in setting of COPD  # Multiple mechanical falls/R sided fib fractures     A 86 yo male with COPD, esophageal cancer s/p esophagectomy had multiple mechanical falls and rib fractures with MSSA bacteremia. Patient needed oxygen with high flow mask oxygen in setting of possible aspiration pneumonia and known COPD. No systemic sign or findings suggestive of endocarditis or skin soft tissue infection on exam. No hardware or device. Hospital course showed he has been on zosyn and vancomycin. TTE was unremarkable for endocarditis. CT whole spine did not show obvious discitis or osteomyelitis.    At this point, would be unclear source of MSSA bacteremia co-existed with possible pneumonia in setting of COPD, which needed high flow oxygen. Xray 5/10 showed concerning for possible pleural effusion, likely from aspiration vs septic emboli.  Would recommend US or another CT to assess worsening X-ray finding concerning for  pleural effusion that could attribute to his respiratory instability.     MRSA nare was negative. Can stop vancomycin. Would recommend IV zosyn to cover the pneumonia and MSSA bacteremia. Will follow clinical signs or findings showing up later suggestive of secondary MSSA infection such as discitis or septic arthritis. Need to confirm the clearance from his blood. Will need repeated CT with contrast to look for the source if persistent bacteremia.      Recommendations  -Obtain repeat 2 sets of blood cultures  -Consider CT chest/A/P versus US to assess possible rapidly developed R pleural effusion and CXR findings 5/10 AM-- on review, CT on admission notable for patulous esophagus with hiatal hernia that contains pancreatic tail? though had trouble seeing the latter  -TTE 5/9 without vegetations or much regurg (had a calcified strut chordae on MV), JORDI needs TBD pending course and BCx  -Discontinue vancomycin given negative MRSA nare  -Continue IV zosyn 2.25g q6h to cover MSSA and aspiration +/- developing parapneumonic effusion versus other process as above  -Monitor joints/spine for signs of metastatic infection    Discussed with the team and Dr. Paula. Please text me via Epic chat if you have any questions or concerns regarding this patient. ID will continue to follow up this patient.    Michelle Carney MD  ID fellow [id number]  Team A   ID pager [phone]  ++++++++++++++++++++++++  I saw and evaluated the patient. I personally obtained the key and critical portions of the history and physical exam or was physically present for key and critical portions performed by the resident/fellow. I reviewed the resident/fellow's documentation and discussed the patient with the resident/fellow. I agree with the resident/fellow's medical decision making as documented in the note.    Rob Paula MD         [1]   Family History  Adopted: Yes   Problem Relation Name Age of Onset    Heart attack Mother      Heart attack Father     [2]    Medications Prior to Admission   Medication Sig Dispense Refill Last Dose/Taking    cyanocobalamin (Vitamin B-12) 500 mcg tablet Take 2 tablets (1,000 mcg) by mouth once daily.   Unknown    amiodarone (Pacerone) 200 mg tablet Take 1 tablet (200 mg) by mouth once daily.   Unknown    lidocaine 4 % patch Place 1 patch on the skin every 12 hours if needed (lower back pain).   Unknown    metoprolol tartrate (Lopressor) 50 mg tablet Take 1 tablet by mouth 2 times a day. Hold if SBP < 100 and HR < 50   Unknown    multivitamin with minerals tablet Take 1 tablet by mouth once daily.   Unknown    ondansetron ODT (Zofran-ODT) 4 mg disintegrating tablet Dissolve 1 tablet (4 mg) in the mouth every 6 hours if needed for nausea or vomiting.   Unknown    traMADol (Ultram) 50 mg tablet Take 1 tablet (50 mg) by mouth 2 times a day.   Unknown   [3] acetaminophen, 1,000 mg, intravenous, q6h  amiodarone, 200 mg, oral, Daily  heparin (porcine), 5,000 Units, subcutaneous, q8h SHANE  insulin regular, 0-10 Units, subcutaneous, TID AC  ipratropium-albuteroL, 3 mL, nebulization, q6h  [Held by provider] lidocaine, 1 patch, transdermal, Daily  metoprolol tartrate, 25 mg, oral, BID  piperacillin-tazobactam, 2.25 g, intravenous, q6h  sennosides, 1 tablet, oral, BID  sodium chloride, 3 mL, nebulization, q6h [facility]    [4] lactated Ringer's, 75 mL/hr, Last Rate: 75 mL/hr (05/10/25 0800)  ropivacaine (PF) in NS cmpd, 10 mL/hr, Last Rate: 10 mL/hr (05/08/25 1558)    [5] PRN medications: dextrose, dextrose, glucagon, glucagon, oxyCODONE, oxyCODONE, oxygen, polyethylene glycol, vancomycin

## 2025-05-10 NOTE — PROGRESS NOTES
Florentin Anderson is a 85 y.o. male on day 2 of admission presenting with Shortness of breath.    Patient presents from [facility]. SW sent return referral to determine if patient is long term care bed hold.  Pending dc plan discission with family and patient for return to SNF.     SW attempted to call friend in [place] to discuss dc planning, and relation to patient. Left message requesting call back. Patient with mild agitation on O2 during attempting interview, pulling on mask with sitter at bedside. He is a bed hold at [facility].    Update: received return call from Vivi Gama from man stating this is the wrong number for Vivi Gama. SW asked SNF for [facility] contact info.

## 2025-05-11 NOTE — DISCHARGE SUMMARY
Discharge Diagnosis  Shortness of breath    Issues Requiring Follow-Up      Test Results Pending At Discharge   Hospital Course  Florentin Anderson is an 86 y/o male with history significant for a-fib (no anticoagulation), lung cancer, esophagectomy, COPD transferred to [facility] from nursing home with nausea and SOB, also had fall 3-4 days prior to presentation and multiple falls in the prior two weeks.    Found to have right 8-11th posterior mildly displaced rib fractures, JULIETA on CKD, acute pancreatitis, pneumonia, and UTI. Anesthesia consulted for pain block.  RAYMOND block on 5/8.  Admitted to ICU for close monitoring.    Escalating 02 requirements 5/9.  Aspirated evening of 5/9. Sepsis work up initiated, UA results concerning for possible underlying UTI, blood cultures (2 of 2 sets) growing MSSA, was started on empiric Zosyn/Vancomycin. On 5/10, he continued to have worsening respiratory failure was placed on Airvo HFNC and over the course of the night was maxed out on 100% FiO2 and 50 liters. Also developed worsening hypotension/shock, arterial line/central line placed, started on Levo/Vaso with gradually escalating requirements. Had goals of care discussion with healthcare power of , Vivi Gama with regards to code status, patients critically-ill state, and high likelihood of death. Code status remained DNR and no intubation per patient's prior wishes, but okay with ICU care/management otherwise. In spite of aggressive measures to hemodynamically stabilize patient with vasopressors and fluid boluses, he developed worsening metabolic acidosis, JULIETA, respiratory failure, and deteriorated to cardiac arrest with asystole on the monitor. Time of Death called at 05:30 on 5/11/2025 by trauma attending Dr. Brady. Vivi Gama was notified of patient's death via phone conversation. Post-mortem care initiated by TICU nursing team.    Pertinent Physical Exam At Time of Discharge  Physical Exam  See above time to death 0530  5 11 2025   Home Medications none required  Outpatient Follow-Up  No future appointments.    Jerry Domínguez MD

## 2025-05-11 NOTE — SIGNIFICANT EVENT
Went to assess patient for daily acute pain rounds and he had passed earlier this morning. Catheter was removed with tip intact.

## 2025-05-11 NOTE — PROGRESS NOTES
2333 RUE sterile drape.   2338 time out  2340 Lidocaine administration  by procedural resident   2349 insertion attempt  004 insertion successful R radial a-line. Sutures X2 placed.   0006 procedure end. sterile dressing placed     A-Line zeroed and flushed. Pulsatile blood flow noted.

## 2025-05-11 NOTE — PROGRESS NOTES
Worsening  BP. TICU team notified. Vaso started albumin given, no improvement in BP. TICU team aware of SBP 50's-60'S. ABG sent from [facility]. 1L LR ordered to run over two hours. No further pressers added at this time. NSR continued and 02 saturation 92% maxed on Airvo.     0125 team to bedside for line placement. Aware of VS. LR set to pressure bag per NP imelda

## 2025-05-11 NOTE — SIGNIFICANT EVENT
"Goals of Care/Code Status Discussion  Date: 5/10/2025  Time: 22:00      Was able to successfully contact emergency contact: Vivi Gama at the following phone number: [phone]    He was able to drive to  and meet me at the bedside for further discussion of patient's clinical status, code status, and goals of care.    Per discussion with Vivi, the patient (Florentin Anderson) has no siblings, children, or other known relatives. Vivi explained that the other listed emergency contact \"Bennie Gama\" was his brother, and was Florentin's significant other for 30 years. He stated that Bennie Gama had  in January of this year.    Upon further review of EMR records, we found that Vivi Gama is the legal HCPOA as documented in EMR \"State of [place] [facility] Power of  (2023)\", and stated that he is still comfortable making healthcare decisions for the patient.    We had a lengthy discussion at the bedside explaining the patient's critically-ill state, worsening respiratory failure on Airvo High  Flow Nasal Cannula with high oxygen requirements, need for vasopressors to maintain adequate blood perfusion, and worsening renal failure, and that the patient is at high-risk for further clinical deterioration resulting in death. We also discussed EMR document form 2023 noting code status of DNR Comfort Care-Arrest \"Providers will treat patient as any other without a DNR order until the point of cardiac or respiratory arrest at which point all interventions will cease and the DNR Comfort Care protocol will be implemented\"    The following is the outcome in terms of goals of care and code status at this time:    CODE STATUS: DNR and No Intubation   - Okay for ICU level care/management   - No CPR or defibrillation in the event of cardiac arrest   - Would not want intubation even if potentially short-term    Consents Obtained for:  - Arterial Line Insertion  - Central Line Insertion  - Blood Product " Transfusion       The above was discussed with trauma attending Dr Caitlyn De La Cruz, APRN-CNP, ACNPC-AG  Trauma Surgical Critical Care  #[id number]

## 2025-05-11 NOTE — PROCEDURES
Procedure  Central Line    Date/Time: 5/11/2025 2:30 AM    Performed by: STARR Menon-CNP  Authorized by: Margaret Brady MD    Consent:     Consent obtained:  Written    Consent given by:  Healthcare agent    Risks, benefits, and alternatives were discussed: yes      Risks discussed:  Arterial puncture, incorrect placement, nerve damage, pneumothorax, infection and bleeding    Alternatives discussed:  No treatment, delayed treatment and observation  Universal protocol:     Procedure explained and questions answered to patient or proxy's satisfaction: yes      Relevant documents present and verified: yes      Test results available: yes      Imaging studies available: yes      Required blood products, implants, devices, and special equipment available: yes      Site/side marked: yes      Immediately prior to procedure, a time out was called: yes      Patient identity confirmed:  Hospital-assigned identification number and arm band  Pre-procedure details:     Indication(s): central venous access, hemodynamic monitoring and insufficient peripheral access      Hand hygiene: Hand hygiene performed prior to insertion      Sterile barrier technique: All elements of maximal sterile technique followed      Skin preparation:  Chlorhexidine    Skin preparation agent: Skin preparation agent completely dried prior to procedure    Sedation:     Sedation type:  None  Anesthesia:     Anesthesia method:  Local infiltration    Local anesthetic:  Lidocaine 1% w/o epi  Procedure details:     Location:  L internal jugular    Patient position:  Trendelenburg    Procedural supplies:  Triple lumen    Catheter size:  7 Fr    Catheter length:  20 (cm)    Landmarks identified: yes      Ultrasound guidance: yes      Ultrasound guidance timing: prior to insertion and real time      Ultrasound guidance timing comment:  Guidewire placement within the left IJ verified in short-axis and long-axis views, verified absence of guidewire in the  left carotid in both views as well    Sterile ultrasound techniques: Sterile gel and sterile probe covers were used      Number of attempts:  1    Successful placement: yes    Post-procedure details:     Post-procedure:  Dressing applied and line sutured    Assessment:  Blood return through all ports, free fluid flow, no pneumothorax on x-ray and placement verified by x-ray    Procedure completion:  Tolerated      CHICHI Meléndez, ACNPC-AG  Trauma Surgical Critical Care  #[id number]

## 2025-05-11 NOTE — PROGRESS NOTES
Pt with decreased mentation downtrend of 02 and BP. HR maintained WNL as of timed note. Pt currently in NSR. MAX 90% Airvo due to desaturations, levo started due to hypotension. Multiple notifications made to the TICU team for plan of care and goals, spoke with [place] abdirashid for updated contacts. Contacts updated in EMR. TICU team decision made for Hallie for better pressor titration.     2129 sterile drape and site marked  2131 time out   2126 L-radial hallie procedure begun   2200 Scott insertion aborted will reconvene with team       2212 friend Vivi at bedside. TICU-NP Terrell to bedside for update

## 2025-05-11 NOTE — PROCEDURES
Procedure  Insert arterial line    Date/Time: 5/11/2025 12:23 AM    Performed by: CHICHI Menon  Authorized by: Margaret Bardy MD    Consent:     Consent obtained:  Written    Consent given by:  Healthcare agent    Risks, benefits, and alternatives were discussed: yes      Risks discussed:  Bleeding, ischemia, pain, infection and repeat procedure  Universal protocol:     Procedure explained and questions answered to patient or proxy's satisfaction: yes      Relevant documents present and verified: yes      Test results available: yes      Imaging studies available: yes      Required blood products, implants, devices, and special equipment available: yes      Site/side marked: yes      Immediately prior to procedure, a time out was called: yes      Patient identity confirmed:  Hospital-assigned identification number and arm band  Indications:     Indications: hemodynamic monitoring and multiple ABGs    Pre-procedure details:     Skin preparation:  Chlorhexidine    Preparation: Patient was prepped and draped in sterile fashion    Sedation:     Sedation type:  None  Anesthesia:     Anesthesia method:  Local infiltration    Local anesthetic:  Lidocaine 1% w/o epi  Procedure details:     Location:  R radial    Chance's test performed: yes      Chance's test abnormal: no      Needle gauge:  18 G    Placement technique:  Ultrasound guided and Seldinger    Number of attempts:  2    Transducer: waveform confirmed and dampened amplitude    Post-procedure details:     Post-procedure:  Biopatch applied, sutured, sterile dressing applied and secured with tape    CMS:  Normal    Procedure completion:  Tolerated  Comments:      Parts of this procedure were completed by Kj Cason PA-C while under my direct supervision        CHICHI Meléndez, [facility]  Trauma Surgical Critical Care  #[id number]

## 2025-05-12 LAB
AORTIC VALVE MEAN GRADIENT: 3 MMHG
AORTIC VALVE PEAK VELOCITY: 1.39 M/S
AV PEAK GRADIENT: 8 MMHG
AVA (PEAK VEL): 1.86 CM2
AVA (VTI): 2.04 CM2
BACTERIA BLD AEROBE CULT: ABNORMAL
BACTERIA BLD CULT: ABNORMAL
BODY SURFACE AREA: 1.74 M2
EJECTION FRACTION APICAL 4 CHAMBER: 58.3
EJECTION FRACTION: 54 %
GRAM STN SPEC: ABNORMAL
LEFT ATRIUM VOLUME AREA LENGTH INDEX BSA: 19.9 ML/M2
LEFT VENTRICLE INTERNAL DIMENSION DIASTOLE: 3.84 CM (ref 3.5–6)
LEFT VENTRICULAR OUTFLOW TRACT DIAMETER: 1.9 CM
MITRAL VALVE E/A RATIO: 0.9
RIGHT VENTRICLE FREE WALL PEAK S': 14 CM/S
RIGHT VENTRICLE PEAK SYSTOLIC PRESSURE: 39 MMHG
TRICUSPID ANNULAR PLANE SYSTOLIC EXCURSION: 2.3 CM

## 2025-05-12 NOTE — SIGNIFICANT EVENT
Patient met CMS criteria for reporting the use of bilateral Mitts within 24 hours of death. CMS form completed and submitted.     Liliana Borden Sr Risk Management Officer  Date: 5/12/2025  Time: 0297

## 2025-06-03 NOTE — DOCUMENTATION CLARIFICATION NOTE
PATIENT:               JIMMIE YOO  ACCT #:                  [id number]  MRN:                       [id number]  :                       1940  ADMIT DATE:       2025 1:23 AM  DISCH DATE:        2025 9:12 AM  RESPONDING PROVIDER #:        [id number]          PROVIDER RESPONSE TEXT:    Patient treated for UTI/PNA without Sepsis    CDI QUERY TEXT:    Clarification        Instruction:  Based on your assessment of the patient and the clinical information, please provide the requested documentation by clicking on the appropriate radio button and enter any additional information if prompted.    Question: Is there a diagnosis indicative of a patient meeting SIRS criteria and with organ dysfunction in the setting of UTI/PNA    When answering this query, please exercise your independent professional judgment. The fact that a question is being asked, does not imply that any particular answer is desired or expected.    The patient's clinical indicators include:  Clinical Information:  84 y/o male with history significant for a-fib (no anticoagulation), lung cancer, esophagectomy, COPD transferred to [facility] from nursing home with nausea and SOB,    Clinical Indicators:  JULIETA on CKD, acute pancreatitis, pneumonia  Escalating 02 requirements .  Aspirated evening of . Sepsis work up initiated,  blood cultures (2 of 2 sets) growing MSSA, was started on empiric Zosyn/Vancomycin  5/10, he continued to have worsening respiratory failure was placed on Airvo HFNC  developed worsening hypotension/shock  WBC 13.9-17.8  creat 5.29  VBG : ph 7.23 pCO2 55  pO2 21    Treatment:  IV Vanco q12/Zosyn IV q6  Airvo HFNC  LEVO/Vaso    Risk Factors:  Hx of Lung Ca, advanced age, UTI /PNA  Options provided:  -- Sepsis with shock renal organ dysfunction of julieta on ckd  -- Sepsis with shock  with respiratory organ dysfunction of respiratory failure  -- Sepsis with shock  with multi-system organ dysfunction of JULIETA/respiratory  --  Sepsis with other organ dysfunction, Please specify sepsis associated organ dysfunction below  -- Patient treated for UTI/PNA without Sepsis  -- Other - I will add my own diagnosis  -- Refer to Clinical Documentation Reviewer    Query created by: Karina Aly on 5/19/2025 3:44 PM      Electronically signed by:  RENEE ALEJO MD 6/3/2025 3:52 PM

## 2026-03-23 ENCOUNTER — APPOINTMENT (OUTPATIENT)
Dept: OPHTHALMOLOGY | Facility: CLINIC | Age: 86
End: 2026-03-23
Payer: MEDICARE